# Patient Record
Sex: FEMALE | Race: WHITE
[De-identification: names, ages, dates, MRNs, and addresses within clinical notes are randomized per-mention and may not be internally consistent; named-entity substitution may affect disease eponyms.]

---

## 2017-03-27 ENCOUNTER — HOSPITAL ENCOUNTER (OUTPATIENT)
Dept: HOSPITAL 8 - ED | Age: 62
Setting detail: OBSERVATION
LOS: 1 days | Discharge: PSYCHIATRIC OR SUBSTANCE USE FACILITY | End: 2017-03-28
Attending: INTERNAL MEDICINE | Admitting: INTERNAL MEDICINE
Payer: MEDICARE

## 2017-03-27 VITALS — WEIGHT: 187.39 LBS | BODY MASS INDEX: 31.99 KG/M2 | HEIGHT: 64 IN

## 2017-03-27 DIAGNOSIS — F29: Primary | ICD-10-CM

## 2017-03-27 DIAGNOSIS — R94.6: ICD-10-CM

## 2017-03-27 DIAGNOSIS — F25.0: ICD-10-CM

## 2017-03-27 DIAGNOSIS — B35.6: ICD-10-CM

## 2017-03-27 DIAGNOSIS — E11.65: ICD-10-CM

## 2017-03-27 DIAGNOSIS — F22: ICD-10-CM

## 2017-03-27 DIAGNOSIS — R44.0: ICD-10-CM

## 2017-03-27 LAB
APAP SERPL-MCNC: < 2 MCG/ML (ref 10–30)
AST SERPL-CCNC: 20 U/L (ref 15–37)
BUN SERPL-MCNC: 23 MG/DL (ref 7–18)
DAU SCREEN: (no result)
HGB BLD-MCNC: 14 G/DL (ref 11.7–16.4)
ICTOTEST: NEGATIVE

## 2017-03-27 PROCEDURE — 36415 COLL VENOUS BLD VENIPUNCTURE: CPT

## 2017-03-27 PROCEDURE — 82962 GLUCOSE BLOOD TEST: CPT

## 2017-03-27 PROCEDURE — 99285 EMERGENCY DEPT VISIT HI MDM: CPT

## 2017-03-27 PROCEDURE — 87086 URINE CULTURE/COLONY COUNT: CPT

## 2017-03-27 PROCEDURE — 84481 FREE ASSAY (FT-3): CPT

## 2017-03-27 PROCEDURE — 80329 ANALGESICS NON-OPIOID 1 OR 2: CPT

## 2017-03-27 PROCEDURE — G0480 DRUG TEST DEF 1-7 CLASSES: HCPCS

## 2017-03-27 PROCEDURE — 85025 COMPLETE CBC W/AUTO DIFF WBC: CPT

## 2017-03-27 PROCEDURE — 84443 ASSAY THYROID STIM HORMONE: CPT

## 2017-03-27 PROCEDURE — 81001 URINALYSIS AUTO W/SCOPE: CPT

## 2017-03-27 PROCEDURE — 84439 ASSAY OF FREE THYROXINE: CPT

## 2017-03-27 PROCEDURE — 80053 COMPREHEN METABOLIC PANEL: CPT

## 2017-03-27 PROCEDURE — G0378 HOSPITAL OBSERVATION PER HR: HCPCS

## 2017-03-27 PROCEDURE — 80307 DRUG TEST PRSMV CHEM ANLYZR: CPT

## 2017-03-27 PROCEDURE — 83036 HEMOGLOBIN GLYCOSYLATED A1C: CPT

## 2017-03-27 RX ADMIN — KETOCONAZOLE SCH APPLIC: 20 CREAM TOPICAL at 21:00

## 2017-03-27 RX ADMIN — ENOXAPARIN SODIUM SCH MG: 40 INJECTION SUBCUTANEOUS at 18:00

## 2017-03-27 RX ADMIN — NICOTINE SCH PATCH: 14 PATCH, EXTENDED RELEASE TRANSDERMAL at 20:54

## 2017-03-28 VITALS — SYSTOLIC BLOOD PRESSURE: 109 MMHG | DIASTOLIC BLOOD PRESSURE: 69 MMHG

## 2017-03-28 VITALS — DIASTOLIC BLOOD PRESSURE: 67 MMHG | SYSTOLIC BLOOD PRESSURE: 100 MMHG

## 2017-03-28 RX ADMIN — ENOXAPARIN SODIUM SCH MG: 40 INJECTION SUBCUTANEOUS at 18:00

## 2017-03-28 RX ADMIN — NITROFURANTOIN MACROCRYSTALS SCH MG: 50 CAPSULE ORAL at 10:44

## 2017-03-28 RX ADMIN — NITROFURANTOIN MACROCRYSTALS SCH MG: 50 CAPSULE ORAL at 20:22

## 2017-03-28 RX ADMIN — KETOCONAZOLE SCH APPLIC: 20 CREAM TOPICAL at 19:28

## 2017-03-28 RX ADMIN — NICOTINE SCH PATCH: 14 PATCH, EXTENDED RELEASE TRANSDERMAL at 09:13

## 2017-03-28 RX ADMIN — NICOTINE SCH PATCH: 14 PATCH, EXTENDED RELEASE TRANSDERMAL at 16:41

## 2017-03-28 RX ADMIN — NITROFURANTOIN MACROCRYSTALS SCH MG: 50 CAPSULE ORAL at 15:50

## 2017-03-28 RX ADMIN — KETOCONAZOLE SCH APPLIC: 20 CREAM TOPICAL at 09:00

## 2019-06-21 ENCOUNTER — APPOINTMENT (OUTPATIENT)
Dept: URGENT CARE | Facility: CLINIC | Age: 64
End: 2019-06-21
Payer: MEDICARE

## 2019-12-19 ENCOUNTER — HOSPITAL ENCOUNTER (OUTPATIENT)
Dept: BEHAVIORAL HEALTH | Facility: MEDICAL CENTER | Age: 64
End: 2019-12-19
Attending: FAMILY MEDICINE
Payer: MEDICARE

## 2019-12-19 VITALS
DIASTOLIC BLOOD PRESSURE: 76 MMHG | BODY MASS INDEX: 31.32 KG/M2 | WEIGHT: 206 LBS | OXYGEN SATURATION: 96 % | RESPIRATION RATE: 16 BRPM | HEART RATE: 90 BPM | SYSTOLIC BLOOD PRESSURE: 145 MMHG

## 2019-12-19 DIAGNOSIS — F15.10 METHAMPHETAMINE USE DISORDER, MILD, ABUSE (HCC): ICD-10-CM

## 2019-12-19 DIAGNOSIS — F25.1 SCHIZOAFFECTIVE DISORDER, DEPRESSIVE TYPE (HCC): ICD-10-CM

## 2019-12-19 DIAGNOSIS — R79.9 ABNORMAL FINDING OF BLOOD CHEMISTRY, UNSPECIFIED: ICD-10-CM

## 2019-12-19 DIAGNOSIS — R73.03 PREDIABETES: ICD-10-CM

## 2019-12-19 DIAGNOSIS — F43.10 PTSD (POST-TRAUMATIC STRESS DISORDER): ICD-10-CM

## 2019-12-19 PROCEDURE — 99204 OFFICE O/P NEW MOD 45 MIN: CPT | Performed by: FAMILY MEDICINE

## 2019-12-19 RX ORDER — PAROXETINE HYDROCHLORIDE 20 MG/1
20 TABLET, FILM COATED ORAL DAILY
Qty: 30 TAB | Refills: 1
Start: 2019-12-19 | End: 2020-04-30 | Stop reason: SDUPTHER

## 2019-12-19 RX ORDER — BUSPIRONE HYDROCHLORIDE 10 MG/1
10 TABLET ORAL 2 TIMES DAILY
Qty: 60 TAB | Refills: 2 | Status: SHIPPED | OUTPATIENT
Start: 2019-12-19 | End: 2020-01-16 | Stop reason: SDUPTHER

## 2019-12-19 RX ORDER — ARIPIPRAZOLE 30 MG/1
30 TABLET ORAL DAILY
Qty: 30 TAB | Refills: 1
Start: 2019-12-19 | End: 2021-04-29

## 2019-12-19 ASSESSMENT — ENCOUNTER SYMPTOMS
HEADACHES: 0
CONSTITUTIONAL NEGATIVE: 1
MUSCULOSKELETAL NEGATIVE: 1
PALPITATIONS: 0
DOUBLE VISION: 0
CHILLS: 0
GASTROINTESTINAL NEGATIVE: 1
INSOMNIA: 1
BLURRED VISION: 0
TINGLING: 0
NEUROLOGICAL NEGATIVE: 1
NERVOUS/ANXIOUS: 1
DIZZINESS: 0
HEMOPTYSIS: 0
COUGH: 0
MYALGIAS: 0
EYES NEGATIVE: 1
BRUISES/BLEEDS EASILY: 0
FEVER: 0
RESPIRATORY NEGATIVE: 1
NAUSEA: 0
HEARTBURN: 0
CARDIOVASCULAR NEGATIVE: 1
DEPRESSION: 1

## 2019-12-19 ASSESSMENT — PAIN SCALES - GENERAL: PAINLEVEL: NO PAIN

## 2019-12-19 ASSESSMENT — LIFESTYLE VARIABLES: SUBSTANCE_ABUSE: 1

## 2019-12-19 NOTE — PROGRESS NOTES
Prior to your 16th birthday:  1. Did a parent or other adult in the household often or very often… Swear at you, insult you, put you down, or humiliate you? or Act in a way that made you afraid that you might be physically hurt?  No___If Yes, enter 1 __0  2. Did a parent or other adult in the household often or very often… Push, grab, slap, or throw something at you? or Ever hit you so hard that you had marks or were injured?  No___If Yes, enter 1 __0  3. Did an adult or person at least 5 years older than you ever… Touch or fondle you or have you touch their body in a sexual way? or Attempt or actually have oral, anal, or vaginal intercourse with you  No___If Yes, enter 1 __1  4. Did you often or very often feel that … No one in your family loved you or thought you were important or special? or Your family didn’t look out for each other, feel close to each other, or support each other?  No___If Yes, enter 1 __0  5. Did you often or very often feel that … You didn’t have enough to eat, had to wear dirty clothes, and had no one to protect you? or Your parents were too drunk or high to take care of you or take you to the doctor if you needed it?  No___If Yes, enter 1 __1  6. Were your parents ever  or ?  No___If Yes, enter 1 __1  7. Was your mother or stepmother:  Often or very often pushed, grabbed, slapped, or had something thrown at her? or Sometimes, often, or very often kicked, bitten, hit with a fist, or hit with something hard? or Ever repeatedly hit over at least a few minutes or threatened with a gun or knife?  No___If Yes, enter 1 __1  8. Did you live with anyone who was a problem drinker or alcoholic, or who used street drugs?  No___If Yes, enter 1 __0  9. Was a household member depressed or mentally ill, or did a household member attempt suicide?                        No___If Yes, enter 1 __1  10. Did a household member go to half-way?  No___If Yes, enter 1 __0  Now add up your “Yes”  answers: _ This is your ACE Score 5
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
## 2020-01-16 ENCOUNTER — HOSPITAL ENCOUNTER (OUTPATIENT)
Dept: BEHAVIORAL HEALTH | Facility: MEDICAL CENTER | Age: 65
End: 2020-01-16
Attending: PSYCHIATRY & NEUROLOGY
Payer: MEDICARE

## 2020-01-16 VITALS
RESPIRATION RATE: 16 BRPM | OXYGEN SATURATION: 96 % | DIASTOLIC BLOOD PRESSURE: 79 MMHG | SYSTOLIC BLOOD PRESSURE: 125 MMHG | BODY MASS INDEX: 30.31 KG/M2 | HEART RATE: 81 BPM | WEIGHT: 200 LBS | HEIGHT: 68 IN

## 2020-01-16 DIAGNOSIS — F25.1 SCHIZOAFFECTIVE DISORDER, DEPRESSIVE TYPE (HCC): ICD-10-CM

## 2020-01-16 DIAGNOSIS — E55.9 VITAMIN D DEFICIENCY: ICD-10-CM

## 2020-01-16 DIAGNOSIS — F43.10 PTSD (POST-TRAUMATIC STRESS DISORDER): ICD-10-CM

## 2020-01-16 DIAGNOSIS — F15.10 METHAMPHETAMINE USE DISORDER, MILD, ABUSE (HCC): ICD-10-CM

## 2020-01-16 DIAGNOSIS — R73.03 PREDIABETES: ICD-10-CM

## 2020-01-16 PROCEDURE — 99214 OFFICE O/P EST MOD 30 MIN: CPT | Performed by: FAMILY MEDICINE

## 2020-01-16 RX ORDER — BUSPIRONE HYDROCHLORIDE 10 MG/1
10 TABLET ORAL 2 TIMES DAILY
Qty: 60 TAB | Refills: 2 | Status: SHIPPED
Start: 2020-01-16 | End: 2020-04-30 | Stop reason: SINTOL

## 2020-01-16 ASSESSMENT — ENCOUNTER SYMPTOMS
COUGH: 0
HEADACHES: 0
TINGLING: 0
NEUROLOGICAL NEGATIVE: 1
DIZZINESS: 0
HEMOPTYSIS: 0
MUSCULOSKELETAL NEGATIVE: 1
CARDIOVASCULAR NEGATIVE: 1
FEVER: 0
HEARTBURN: 0
CHILLS: 0
GASTROINTESTINAL NEGATIVE: 1
NERVOUS/ANXIOUS: 1
PALPITATIONS: 0
BRUISES/BLEEDS EASILY: 0
RESPIRATORY NEGATIVE: 1
EYES NEGATIVE: 1
NAUSEA: 0
DOUBLE VISION: 0
CONSTITUTIONAL NEGATIVE: 1
DEPRESSION: 0
BLURRED VISION: 0
MYALGIAS: 0

## 2020-01-16 ASSESSMENT — PAIN SCALES - GENERAL: PAINLEVEL: NO PAIN

## 2020-01-16 ASSESSMENT — LIFESTYLE VARIABLES: SUBSTANCE_ABUSE: 1

## 2020-01-16 NOTE — PROGRESS NOTES
"Subjective:      Angelica Dumont is a 64 y.o. female who presents with No chief complaint on file.            1. Schizoaffective disorder, depressive type (HCC)  Patient noted that they have just been taking buspar qd instead of rx of bid  Will start bid  Mood still a bit down at times  Also reports that she feels that she has a \"split personality\" that she noted for past year. She states that she will not remember times and people tell her she was aggressive and foul-mouthed during this time period  Asked her to keep diary of incidents so that we can discuss them further at our next visit  - busPIRone (BUSPAR) 10 MG Tab tablet; Take 1 Tab by mouth 2 times a day.  Dispense: 60 Tab; Refill: 2    2. Methamphetamine use disorder, mild, abuse (HCC)  Has not changed her use of meth, still 3-4 times per week  Will increase buspar dosage and Today the patient was counseled on avoidance of people, places and things that could be triggers for substance use    - busPIRone (BUSPAR) 10 MG Tab tablet; Take 1 Tab by mouth 2 times a day.  Dispense: 60 Tab; Refill: 2    3. Prediabetes   Labs with elevated a1c of 6  Due to the patient's issues with glucose management, today they were extensively counseled on a low carb diet and exercise and losing weight      4. Vitamin D deficiency  Labs show patient is low on vitamin d, they needs to replace this with 2000 units per day otc      5. PTSD (post-traumatic stress disorder)    - busPIRone (BUSPAR) 10 MG Tab tablet; Take 1 Tab by mouth 2 times a day.  Dispense: 60 Tab; Refill: 2    Past Medical History:  No date: Schizophrenia (HCC)  No date: Substance abuse ([facility])      Comment:  meth use  Past Surgical History:  No date: APPENDECTOMY  No date: OPEN REDUCTION  Social History    Tobacco Use      Smoking status: Current Every Day Smoker        Packs/day: 0.50      Smokeless tobacco: Never Used    Alcohol use: Not Currently    Drug use: Yes      Frequency: 3.0 times per week      " "Types: Methamphetamines, Inhaled    Review of patient's family history indicates:  Problem: Cancer      Relation: Mother          Age of Onset: (Not Specified)      Current Outpatient Medications: •  busPIRone (BUSPAR) 10 MG Tab tablet, Take 1 Tab by mouth 2 times a day., Disp: 60 Tab, Rfl: 2•  aripiprazole (ABILIFY) 30 MG tablet, Take 1 Tab by mouth every day., Disp: 30 Tab, Rfl: 1•  PARoxetine (PAXIL) 20 MG Tab, Take 1 Tab by mouth every day., Disp: 30 Tab, Rfl: 1    Patient was instructed on the use of medications, either prescriptions or OTC and informed on when the appropriate follow up time period should be. In addition, patient was also instructed that should any acute worsening occur that they should notify this clinic asap or call 911.          Review of Systems   Constitutional: Negative.  Negative for chills and fever.   HENT: Negative.  Negative for hearing loss.    Eyes: Negative.  Negative for blurred vision and double vision.   Respiratory: Negative.  Negative for cough and hemoptysis.    Cardiovascular: Negative.  Negative for chest pain and palpitations.   Gastrointestinal: Negative.  Negative for heartburn and nausea.   Genitourinary: Negative.  Negative for dysuria.   Musculoskeletal: Negative.  Negative for myalgias.   Skin: Negative.  Negative for rash.   Neurological: Negative.  Negative for dizziness, tingling and headaches.   Endo/Heme/Allergies: Negative.  Does not bruise/bleed easily.   Psychiatric/Behavioral: Positive for substance abuse. Negative for depression and suicidal ideas. The patient is nervous/anxious.    All other systems reviewed and are negative.         Objective:     /79 (BP Location: Left arm, Patient Position: Sitting, BP Cuff Size: Adult)   Pulse 81   Resp 16   Ht 1.727 m (5' 8\")   Wt 90.7 kg (200 lb)   SpO2 96%   BMI 30.41 kg/m²      Physical Exam  Vitals signs and nursing note reviewed.   Constitutional:       General: She is not in acute distress.     " "Appearance: She is well-developed. She is not diaphoretic.   HENT:      Head: Normocephalic and atraumatic.      Mouth/Throat:      Pharynx: No oropharyngeal exudate.   Eyes:      Pupils: Pupils are equal, round, and reactive to light.   Cardiovascular:      Rate and Rhythm: Normal rate and regular rhythm.      Heart sounds: Normal heart sounds. No murmur. No friction rub. No gallop.    Pulmonary:      Effort: Pulmonary effort is normal. No respiratory distress.      Breath sounds: Normal breath sounds. No wheezing or rales.   Chest:      Chest wall: No tenderness.   Neurological:      Mental Status: She is alert and oriented to person, place, and time.   Psychiatric:         Attention and Perception: Attention normal.         Mood and Affect: Mood normal.         Speech: Speech normal.         Behavior: Behavior normal.         Thought Content: Thought content normal.         Cognition and Memory: Cognition and memory normal.         Judgment: Judgment normal.                 Assessment/Plan:   1. Schizoaffective disorder, depressive type (HCC)  Patient noted that they have just been taking buspar qd instead of rx of bid  Will start bid  Mood still a bit down at times  Also reports that she feels that she has a \"split personality\" that she noted for past year. She states that she will not remember times and people tell her she was aggressive and foul-mouthed during this time period  Asked her to keep diary of incidents so that we can discuss them further at our next visit  - busPIRone (BUSPAR) 10 MG Tab tablet; Take 1 Tab by mouth 2 times a day.  Dispense: 60 Tab; Refill: 2    2. Methamphetamine use disorder, mild, abuse (HCC)  Has not changed her use of meth, still 3-4 times per week  Will increase buspar dosage and Today the patient was counseled on avoidance of people, places and things that could be triggers for substance use    - busPIRone (BUSPAR) 10 MG Tab tablet; Take 1 Tab by mouth 2 times a day.  " Dispense: 60 Tab; Refill: 2    3. Prediabetes   Labs with elevated a1c of 6  Due to the patient's issues with glucose management, today they were extensively counseled on a low carb diet and exercise and losing weight      4. Vitamin D deficiency  Labs show patient is low on vitamin d, they needs to replace this with 2000 units per day otc      5. PTSD (post-traumatic stress disorder)    - busPIRone (BUSPAR) 10 MG Tab tablet; Take 1 Tab by mouth 2 times a day.  Dispense: 60 Tab; Refill: 2

## 2020-02-27 ENCOUNTER — HOSPITAL ENCOUNTER (OUTPATIENT)
Dept: BEHAVIORAL HEALTH | Facility: MEDICAL CENTER | Age: 65
End: 2020-02-27
Attending: PSYCHIATRY & NEUROLOGY
Payer: MEDICARE

## 2020-02-27 VITALS
BODY MASS INDEX: 30.31 KG/M2 | HEIGHT: 68 IN | DIASTOLIC BLOOD PRESSURE: 84 MMHG | WEIGHT: 200 LBS | HEART RATE: 86 BPM | SYSTOLIC BLOOD PRESSURE: 136 MMHG

## 2020-02-27 DIAGNOSIS — F25.1 SCHIZOAFFECTIVE DISORDER, DEPRESSIVE TYPE (HCC): ICD-10-CM

## 2020-02-27 DIAGNOSIS — F43.10 PTSD (POST-TRAUMATIC STRESS DISORDER): ICD-10-CM

## 2020-02-27 DIAGNOSIS — F15.10 METHAMPHETAMINE USE DISORDER, MILD, ABUSE (HCC): ICD-10-CM

## 2020-02-27 PROCEDURE — 99213 OFFICE O/P EST LOW 20 MIN: CPT | Performed by: FAMILY MEDICINE

## 2020-02-27 ASSESSMENT — ENCOUNTER SYMPTOMS
NAUSEA: 0
TINGLING: 0
FEVER: 0
CARDIOVASCULAR NEGATIVE: 1
RESPIRATORY NEGATIVE: 1
PALPITATIONS: 0
NEUROLOGICAL NEGATIVE: 1
COUGH: 0
EYES NEGATIVE: 1
DEPRESSION: 0
HEARTBURN: 0
CHILLS: 0
DIZZINESS: 0
GASTROINTESTINAL NEGATIVE: 1
BRUISES/BLEEDS EASILY: 0
MYALGIAS: 0
HEADACHES: 0
DOUBLE VISION: 0
MUSCULOSKELETAL NEGATIVE: 1
BLURRED VISION: 0
HEMOPTYSIS: 0
CONSTITUTIONAL NEGATIVE: 1

## 2020-02-27 ASSESSMENT — LIFESTYLE VARIABLES: SUBSTANCE_ABUSE: 1

## 2020-02-27 NOTE — PROGRESS NOTES
Subjective:      Angelica Dumont is a 64 y.o. female who presents with No chief complaint on file.            1. Schizoaffective disorder, depressive type (HCC)  Stable with no recent psychotic episodes nor changes in mood  No recent occurrences of 'other personality' that changes location of items in her room    2. Methamphetamine use disorder, mild, abuse (HCC)  Still using 1-2 times per week but less than before  Has insight in that her usage is related to anger issues and certain other residents of her complex who come over and do meth with her  Today the patient was counseled on avoidance of people, places and things that could be triggers for substance use      3. PTSD (post-traumatic stress disorder)  Stable  Continue with meds    Past Medical History:  No date: Schizophrenia (HCC)  No date: Substance abuse (HCC)      Comment:  meth use  Past Surgical History:  No date: APPENDECTOMY  No date: OPEN REDUCTION  Social History    Tobacco Use      Smoking status: Current Every Day Smoker        Packs/day: 0.50      Smokeless tobacco: Never Used    Alcohol use: Not Currently    Drug use: Yes      Frequency: 3.0 times per week      Types: Methamphetamines, Inhaled    Review of patient's family history indicates:  Problem: Cancer      Relation: Mother          Age of Onset: (Not Specified)      Current Outpatient Medications: •  busPIRone (BUSPAR) 10 MG Tab tablet, Take 1 Tab by mouth 2 times a day., Disp: 60 Tab, Rfl: 2•  aripiprazole (ABILIFY) 30 MG tablet, Take 1 Tab by mouth every day., Disp: 30 Tab, Rfl: 1•  PARoxetine (PAXIL) 20 MG Tab, Take 1 Tab by mouth every day., Disp: 30 Tab, Rfl: 1    Patient was instructed on the use of medications, either prescriptions or OTC and informed on when the appropriate follow up time period should be. In addition, patient was also instructed that should any acute worsening occur that they should notify this clinic asap or call 911.          Review of Systems  "  Constitutional: Negative.  Negative for chills and fever.   HENT: Negative.  Negative for hearing loss.    Eyes: Negative.  Negative for blurred vision and double vision.   Respiratory: Negative.  Negative for cough and hemoptysis.    Cardiovascular: Negative.  Negative for chest pain and palpitations.   Gastrointestinal: Negative.  Negative for heartburn and nausea.   Genitourinary: Negative.  Negative for dysuria.   Musculoskeletal: Negative.  Negative for myalgias.   Skin: Negative.  Negative for rash.   Neurological: Negative.  Negative for dizziness, tingling and headaches.   Endo/Heme/Allergies: Negative.  Does not bruise/bleed easily.   Psychiatric/Behavioral: Positive for substance abuse. Negative for depression and suicidal ideas.   All other systems reviewed and are negative.         Objective:     /84 (BP Location: Left arm, Patient Position: Sitting, BP Cuff Size: Adult)   Pulse 86   Ht 1.727 m (5' 8\")   Wt 90.7 kg (200 lb)   BMI 30.41 kg/m²      Physical Exam  Vitals signs and nursing note reviewed.   Constitutional:       General: She is not in acute distress.     Appearance: She is well-developed. She is not diaphoretic.   HENT:      Head: Normocephalic and atraumatic.      Mouth/Throat:      Pharynx: No oropharyngeal exudate.   Eyes:      Pupils: Pupils are equal, round, and reactive to light.   Cardiovascular:      Rate and Rhythm: Normal rate and regular rhythm.      Heart sounds: Normal heart sounds. No murmur. No friction rub. No gallop.    Pulmonary:      Effort: Pulmonary effort is normal. No respiratory distress.      Breath sounds: Normal breath sounds. No wheezing or rales.   Chest:      Chest wall: No tenderness.   Neurological:      Mental Status: She is alert and oriented to person, place, and time.   Psychiatric:         Mood and Affect: Mood normal.         Speech: Speech normal.         Behavior: Behavior normal.         Thought Content: Thought content normal.         " Judgment: Judgment normal.                 Assessment/Plan:   1. Schizoaffective disorder, depressive type (HCC)  Stable with no recent psychotic episodes nor changes in mood  No recent occurrences of 'other personality' that changes location of items in her room    2. Methamphetamine use disorder, mild, abuse (HCC)  Still using 1-2 times per week but less than before  Has insight in that her usage is related to anger issues and certain other residents of her complex who come over and do meth with her  Today the patient was counseled on avoidance of people, places and things that could be triggers for substance use      3. PTSD (post-traumatic stress disorder)  Stable  Continue with meds

## 2020-04-28 ENCOUNTER — HOSPITAL ENCOUNTER (OUTPATIENT)
Dept: BEHAVIORAL HEALTH | Facility: MEDICAL CENTER | Age: 65
End: 2020-04-28
Attending: PSYCHIATRY & NEUROLOGY
Payer: MEDICARE

## 2020-04-28 DIAGNOSIS — F15.20 METHAMPHETAMINE USE DISORDER, MODERATE (HCC): ICD-10-CM

## 2020-04-28 PROCEDURE — 90791 PSYCH DIAGNOSTIC EVALUATION: CPT
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
## 2020-04-28 NOTE — PROGRESS NOTES
I have reviewed the note by Stephanie Singh, CPC-I and agree with the assessment and treatment plan.    1. Admit to Intensive Outpatient Program on 5/4/2020   - Group therapy per schedule,    - Psychoeducational groups per schedule,   - Individual/family counseling sessions with  per treatment plan.  2. Symptoms necessitating Intensive Outpatient Treatment: methamphetamine use  3. Medical screening/Physical exam per primary care provider or referring facility.    Maryanne Flores MD

## 2020-04-30 ENCOUNTER — TELEPHONE (OUTPATIENT)
Dept: BEHAVIORAL HEALTH | Facility: MEDICAL CENTER | Age: 65
End: 2020-04-30

## 2020-04-30 ENCOUNTER — HOSPITAL ENCOUNTER (OUTPATIENT)
Dept: BEHAVIORAL HEALTH | Facility: MEDICAL CENTER | Age: 65
End: 2020-04-30
Attending: FAMILY MEDICINE
Payer: MEDICARE

## 2020-04-30 VITALS
SYSTOLIC BLOOD PRESSURE: 128 MMHG | OXYGEN SATURATION: 92 % | DIASTOLIC BLOOD PRESSURE: 88 MMHG | BODY MASS INDEX: 30.19 KG/M2 | WEIGHT: 199.2 LBS | HEART RATE: 99 BPM | HEIGHT: 68 IN

## 2020-04-30 DIAGNOSIS — F43.10 PTSD (POST-TRAUMATIC STRESS DISORDER): ICD-10-CM

## 2020-04-30 DIAGNOSIS — F25.1 SCHIZOAFFECTIVE DISORDER, DEPRESSIVE TYPE (HCC): ICD-10-CM

## 2020-04-30 DIAGNOSIS — F15.20 METHAMPHETAMINE USE DISORDER, MODERATE (HCC): ICD-10-CM

## 2020-04-30 PROCEDURE — 99214 OFFICE O/P EST MOD 30 MIN: CPT | Performed by: FAMILY MEDICINE

## 2020-04-30 RX ORDER — PAROXETINE 30 MG/1
30 TABLET, FILM COATED ORAL DAILY
Qty: 90 TAB | Refills: 1 | Status: SHIPPED
Start: 2020-04-30 | End: 2021-04-29

## 2020-04-30 ASSESSMENT — ENCOUNTER SYMPTOMS
FEVER: 0
NEUROLOGICAL NEGATIVE: 1
NAUSEA: 0
EYES NEGATIVE: 1
CONSTITUTIONAL NEGATIVE: 1
TINGLING: 0
BRUISES/BLEEDS EASILY: 0
MUSCULOSKELETAL NEGATIVE: 1
GASTROINTESTINAL NEGATIVE: 1
HALLUCINATIONS: 1
HEMOPTYSIS: 0
RESPIRATORY NEGATIVE: 1
CHILLS: 0
PALPITATIONS: 0
HEADACHES: 0
CARDIOVASCULAR NEGATIVE: 1
MYALGIAS: 0
HEARTBURN: 0
DIZZINESS: 0
COUGH: 0
NERVOUS/ANXIOUS: 1
DEPRESSION: 1
DOUBLE VISION: 0
BLURRED VISION: 0

## 2020-04-30 ASSESSMENT — LIFESTYLE VARIABLES: SUBSTANCE_ABUSE: 1

## 2020-04-30 NOTE — PROGRESS NOTES
Subjective:      Angelica Dumont is a 65 y.o. female who presents with Follow-Up (Med management )            1. Methamphetamine use disorder, moderate (HCC)  Patient reports being sober for 4 days now  Her main issues is the other members of her apartment complex many of whom are fellow drug users and will offer drugs to her  Long discussion today about Today the patient was counseled on avoidance of people, places and things that could be triggers for substance use  She has started the IOP program here which may provide some help in distancing herself from bad influences  2. Schizoaffective disorder, depressive type (HCC)  Reports some episodes of anxiety and depression regarding different issues at times  Able to do all adls  Will try increase in her paxil and f/u in 4 weeks for efficacy    - PARoxetine (PAXIL) 30 MG Tab; Take 1 Tab by mouth every day.  Dispense: 90 Tab; Refill: 1    3. PTSD (post-traumatic stress disorder)    - PARoxetine (PAXIL) 30 MG Tab; Take 1 Tab by mouth every day.  Dispense: 90 Tab; Refill: 1    Past Medical History:  No date: Schizophrenia (HCC)  No date: Substance abuse (HCC)      Comment:  meth use  Past Surgical History:  No date: APPENDECTOMY  No date: OPEN REDUCTION  Social History    Tobacco Use      Smoking status: Current Every Day Smoker        Packs/day: 0.50      Smokeless tobacco: Never Used    Alcohol use: Not Currently    Drug use: Yes      Frequency: 3.0 times per week      Types: Methamphetamines, Inhaled    Review of patient's family history indicates:  Problem: Cancer      Relation: Mother          Age of Onset: (Not Specified)  Problem: Alcohol abuse      Relation: Mother          Age of Onset: (Not Specified)  Problem: Alcohol abuse      Relation: Brother          Age of Onset: (Not Specified)  Problem: Drug abuse      Relation: Brother          Age of Onset: (Not Specified)  Problem: Schizophrenia      Relation: Brother          Age of Onset: (Not  "Specified)  Problem: Depression      Relation: Maternal Grandmother          Age of Onset: (Not Specified)  Problem: Alcohol abuse      Relation: Step-Father          Age of Onset: (Not Specified)      Current Outpatient Medications: •  PARoxetine (PAXIL) 30 MG Tab, Take 1 Tab by mouth every day., Disp: 90 Tab, Rfl: 1•  aripiprazole (ABILIFY) 30 MG tablet, Take 1 Tab by mouth every day., Disp: 30 Tab, Rfl: 1    Patient was instructed on the use of medications, either prescriptions or OTC and informed on when the appropriate follow up time period should be. In addition, patient was also instructed that should any acute worsening occur that they should notify this clinic asap or call 911.          Review of Systems   Constitutional: Negative.  Negative for chills and fever.   HENT: Negative.  Negative for hearing loss.    Eyes: Negative.  Negative for blurred vision and double vision.   Respiratory: Negative.  Negative for cough and hemoptysis.    Cardiovascular: Negative.  Negative for chest pain and palpitations.   Gastrointestinal: Negative.  Negative for heartburn and nausea.   Genitourinary: Negative.  Negative for dysuria.   Musculoskeletal: Negative.  Negative for myalgias.   Skin: Negative.  Negative for rash.   Neurological: Negative.  Negative for dizziness, tingling and headaches.   Endo/Heme/Allergies: Negative.  Does not bruise/bleed easily.   Psychiatric/Behavioral: Positive for depression, hallucinations and substance abuse. Negative for suicidal ideas. The patient is nervous/anxious.    All other systems reviewed and are negative.         Objective:     /88 (BP Location: Left arm, Patient Position: Sitting, BP Cuff Size: Large adult)   Pulse 99   Ht 1.715 m (5' 7.5\")   Wt 90.4 kg (199 lb 3.2 oz)   SpO2 92%   BMI 30.74 kg/m²      Physical Exam  Vitals signs and nursing note reviewed.   Constitutional:       General: She is not in acute distress.     Appearance: She is well-developed. She is " not diaphoretic.   HENT:      Head: Normocephalic and atraumatic.      Mouth/Throat:      Pharynx: No oropharyngeal exudate.   Eyes:      Pupils: Pupils are equal, round, and reactive to light.   Cardiovascular:      Rate and Rhythm: Normal rate and regular rhythm.      Heart sounds: Normal heart sounds. No murmur. No friction rub. No gallop.    Pulmonary:      Effort: Pulmonary effort is normal. No respiratory distress.      Breath sounds: Normal breath sounds. No wheezing or rales.   Chest:      Chest wall: No tenderness.   Neurological:      Mental Status: She is alert and oriented to person, place, and time.   Psychiatric:         Attention and Perception: Attention normal.         Mood and Affect: Mood normal.         Speech: Speech normal.         Behavior: Behavior normal.         Thought Content: Thought content normal.         Judgment: Judgment normal.                 Assessment/Plan:     1. Methamphetamine use disorder, moderate (HCC)  Patient reports being sober for 4 days now  Her main issues is the other members of her apartment complex many of whom are fellow drug users and will offer drugs to her  Long discussion today about Today the patient was counseled on avoidance of people, places and things that could be triggers for substance use  She has started the IOP program here which may provide some help in distancing herself from bad influences  2. Schizoaffective disorder, depressive type (HCC)  Reports some episodes of anxiety and depression regarding different issues at times  Able to do all adls  Will try increase in her paxil and f/u in 4 weeks for efficacy    - PARoxetine (PAXIL) 30 MG Tab; Take 1 Tab by mouth every day.  Dispense: 90 Tab; Refill: 1    3. PTSD (post-traumatic stress disorder)    - PARoxetine (PAXIL) 30 MG Tab; Take 1 Tab by mouth every day.  Dispense: 90 Tab; Refill: 1

## 2020-04-30 NOTE — TELEPHONE ENCOUNTER
Called Angelica and left her a message that her insurance is covered and she does not have a co-pay.

## 2020-05-04 ENCOUNTER — HOSPITAL ENCOUNTER (OUTPATIENT)
Dept: BEHAVIORAL HEALTH | Facility: MEDICAL CENTER | Age: 65
End: 2020-05-04
Attending: PSYCHIATRY & NEUROLOGY
Payer: MEDICARE

## 2020-05-04 DIAGNOSIS — F15.20 METHAMPHETAMINE USE DISORDER, MODERATE (HCC): ICD-10-CM

## 2020-05-04 DIAGNOSIS — F15.10 METHAMPHETAMINE USE DISORDER, MILD, ABUSE (HCC): ICD-10-CM

## 2020-05-04 DIAGNOSIS — F25.1 SCHIZOAFFECTIVE DISORDER, DEPRESSIVE TYPE (HCC): ICD-10-CM

## 2020-05-04 DIAGNOSIS — F43.10 POSTTRAUMATIC STRESS DISORDER: ICD-10-CM

## 2020-05-04 PROCEDURE — 90834 PSYTX W PT 45 MINUTES: CPT

## 2020-05-04 PROCEDURE — 90853 GROUP PSYCHOTHERAPY: CPT | Performed by: MARRIAGE & FAMILY THERAPIST

## 2020-05-04 NOTE — BH THERAPY
Group Therapy Checklist  Attendance: Attended  Attendance Duration (min): (60)  Number of Participants: 4  Program/Group: Intensive Outpatient Program  Topics Covered: Addiction behaviors  Participation: Active verbal participation  Affect/Mood Range: Constricted  Affect/Mood Display: Congruent w/content  Cognition: Oriented, Alert  Evidence of Imminent Suicide Risk: No  Evidence of imminent homicide risk: No  Therapeutic Interventions: Psychoeducation re: (Comment), Cognitive clarification  Progress Toward Treatment Goal: Moderate improvement
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
## 2020-05-04 NOTE — BH THERAPY
Group Therapy Checklist  Attendance: Attended  Attendance Duration (min): (90)  Number of Participants: 4  Program/Group: Intensive Outpatient Program  Topics Covered: (Process Group)  Participation: Active verbal participation, Attentive, Guarded/resistant. Angelica talked about being her struggles to better control her behaviors.   Affect/Mood Range: Constricted  Affect/Mood Display: Congruent w/content  Cognition: Alert, Oriented  Evidence of Imminent Suicide Risk: No  Evidence of imminent homicide risk: No  Therapeutic Interventions: Supportive psychotherapy, Emotion clarification, Cognitive clarification  Progress Toward Treatment Goal: Mild improvement

## 2020-05-06 ENCOUNTER — HOSPITAL ENCOUNTER (OUTPATIENT)
Dept: BEHAVIORAL HEALTH | Facility: MEDICAL CENTER | Age: 65
End: 2020-05-06
Attending: PSYCHIATRY & NEUROLOGY
Payer: MEDICARE

## 2020-05-06 DIAGNOSIS — F15.10 METHAMPHETAMINE USE DISORDER, MILD, ABUSE (HCC): ICD-10-CM

## 2020-05-06 PROCEDURE — 90853 GROUP PSYCHOTHERAPY: CPT

## 2020-05-06 NOTE — BH THERAPY
Group Therapy Checklist  Attendance: Attended  Attendance Duration (min): (60)  Number of Participants: 4  Program/Group: Intensive Outpatient Program  Topics Covered: 12 Step orientation  Participation: Active verbal participation, Attentive  Affect/Mood Range: Normal range, Flexible  Affect/Mood Display: Congruent w/content  Cognition: Alert, Oriented  Evidence of Imminent Suicide Risk: No  Evidence of imminent homicide risk: No  Therapeutic Interventions: Cognitive clarification, Relapse prevention  Progress Toward Treatment Goal: Mild improvement

## 2020-05-06 NOTE — BH THERAPY
Group Therapy Checklist  Attendance: Attended  Attendance Duration (min): (90)  Number of Participants: 4  Program/Group: Intensive Outpatient Program  Topics Covered: (Group Therapy)  Participation: Active verbal participation. Pt shared that she is here to quit using meth and heal the relationships with her daughters. She also shared her Dx.   Affect/Mood Range: Constricted  Affect/Mood Display: Congruent w/content  Cognition: Oriented, Alert  Evidence of Imminent Suicide Risk: No  Evidence of imminent homicide risk: No  Progress Toward Treatment Goal: Moderate improvement

## 2020-05-08 ENCOUNTER — HOSPITAL ENCOUNTER (OUTPATIENT)
Dept: BEHAVIORAL HEALTH | Facility: MEDICAL CENTER | Age: 65
End: 2020-05-08
Attending: PSYCHIATRY & NEUROLOGY
Payer: MEDICARE

## 2020-05-08 DIAGNOSIS — F15.10 METHAMPHETAMINE USE DISORDER, MILD, ABUSE (HCC): ICD-10-CM

## 2020-05-08 PROCEDURE — 90853 GROUP PSYCHOTHERAPY: CPT

## 2020-05-08 NOTE — BH THERAPY
Group Therapy Checklist  Attendance: Attended  Attendance Duration (min): (60)  Number of Participants: 4  Program/Group: Intensive Outpatient Program  Topics Covered: Codependency  Participation: Active verbal participation, Attentive, Open to feedback  Affect/Mood Range: Normal range, Flexible  Affect/Mood Display: Congruent w/content  Cognition: Alert, Oriented  Evidence of Imminent Suicide Risk: No  Evidence of imminent homicide risk: No  Therapeutic Interventions: Cognitive clarification, Emotion clarification  Progress Toward Treatment Goal: Moderate improvement

## 2020-05-08 NOTE — BH THERAPY
Group Therapy Checklist  Attendance: Attended  Attendance Duration (min): (90)  Number of Participants: 4  Program/Group: Intensive Outpatient Program  Topics Covered: (Group Therapy)  Participation: Active verbal participation.  Pt stated her awareness of needing to change her behavior of yelling and displays of anger with her daughters which pushes them away.   Affect/Mood Range: Constricted  Affect/Mood Display: Congruent w/content  Cognition: Oriented, Alert  Evidence of Imminent Suicide Risk: No  Evidence of imminent homicide risk: No  Therapeutic Interventions: Emotion clarification, Cognitive clarification  Progress Toward Treatment Goal: Moderate improvement

## 2020-05-11 ENCOUNTER — HOSPITAL ENCOUNTER (OUTPATIENT)
Dept: BEHAVIORAL HEALTH | Facility: MEDICAL CENTER | Age: 65
End: 2020-05-11
Attending: PSYCHIATRY & NEUROLOGY
Payer: MEDICARE

## 2020-05-11 ENCOUNTER — TELEPHONE (OUTPATIENT)
Dept: BEHAVIORAL HEALTH | Facility: MEDICAL CENTER | Age: 65
End: 2020-05-11

## 2020-05-11 DIAGNOSIS — F15.10 METHAMPHETAMINE USE DISORDER, MILD, ABUSE (HCC): ICD-10-CM

## 2020-05-11 DIAGNOSIS — F25.1 SCHIZOAFFECTIVE DISORDER, DEPRESSIVE TYPE (HCC): ICD-10-CM

## 2020-05-11 PROCEDURE — 90853 GROUP PSYCHOTHERAPY: CPT | Performed by: MARRIAGE & FAMILY THERAPIST

## 2020-05-11 PROCEDURE — 90834 PSYTX W PT 45 MINUTES: CPT

## 2020-05-11 NOTE — BH THERAPY
Group Therapy Checklist  Attendance: Attended  Attendance Duration (min): (90)  Number of Participants: 4  Program/Group: Intensive Outpatient Program  Topics Covered: (Process Group)  Participation: Active verbal participation, Attentive. Angelica shared today that she does not have access to sad, scare, or happy. Mostly she expresses all of her emotions with anger.   Affect/Mood Range: Normal range  Affect/Mood Display: Congruent w/content  Cognition: Alert, Oriented  Evidence of Imminent Suicide Risk: No  Evidence of imminent homicide risk: No  Therapeutic Interventions: Supportive psychotherapy, Therapeutic metaphor, Emotion clarification  Progress Toward Treatment Goal: Mild improvement

## 2020-05-11 NOTE — BH THERAPY
Group Therapy Checklist  Attendance: Attended  Attendance Duration (min): (60)  Number of Participants: 4  Program/Group: Intensive Outpatient Program  Topics Covered: Steps 1/2/3  Participation: Active verbal participation  Affect/Mood Range: Constricted, Flexible  Affect/Mood Display: Congruent w/content  Cognition: Oriented, Alert  Evidence of Imminent Suicide Risk: No  Evidence of imminent homicide risk: No  Therapeutic Interventions: Psychoeducation re: (Comment), Cognitive clarification  Progress Toward Treatment Goal: Moderate improvement

## 2020-05-11 NOTE — TELEPHONE ENCOUNTER
[facility]    Treatment Team Staffing    Patient Name: Angelica Dumont Program: IOP Date: 5/11/2020     Attendees: IESHA Grimm, [facility]; Fay Dias RN, Stephanie Singh, CPC-I, and Maryanne Flores MD     Patient's Progress toward Goals Listed on the Treatment Plan: abstinent, working on the first three steps of 12 step modality.     1. Client's Participation When in Attendance Was: Active in a Positive Way    2. Counselor's Evaluation of Client's Progress: Positive Movement    3. Patient is attending group and individual sessions and is progressing well toward the treatment goals: yes      YES NO   A. Relapse During Program []  [x]    B. Requires physician review []  [x]    C. Referral to program inappropriate []  [x]    D. Non compliance with Treatment Plan []  [x]    E. Early treatment termination (lack of attendance) []  [x]     []  []      Comments: still not speaking with daughter and grandchildren, taking it in stride.     Treatment Plan Review: - Next appointment scheduled:  5/13/2020  - Patient is in agreement with the above plan:  YES

## 2020-05-11 NOTE — CARE PLAN
" [facility]  Therapy Progress Note    Patient Name: Angelica Dumont  Patient MRN: [id number]  Today's Date: 5/11/2020     Type of session:Individual psychotherapy  Length of session: 45 minutes  Persons in attendance:Patient    Subjective/New Info: individual session. Angelica processed her step one. She is reluctant to admit that she is powerless over anything \"if you say so\" or her unmanageability.  She reports not being able to stop one substance without picking up another, going back and forth between meth, marijuana and sex. She is reluctant to admit the financial burden \"I can afford it\" or the emotional cost \"I've just ignored it\", but today she feels that she just never looked at addiction in this way before. She states that after today's forum on the first three steps, she is recognizing the above. She also notices that she feels better clean and without behavior that compromises her integrity. She feels that she learned how to manage her mental health with discipline: sleep diet and medication with visits to her psychiatrist and is looking to adopt the same for her addictions.  She feels she has great saul and we introduced step two to explore that relationship; to be discussed at next weeks session, 5/18.    Objective/Observations:   Participation: Active verbal participation, Attentive, Guarded, and Defensive   Grooming: Casual and Neat   Cognition: Alert and Fully Oriented   Eye contact: Limited   Mood: Anxious   Affect: Flexible, Full range, and Congruent with content   Thought process: Logical and Goal-directed   Speech: Rate within normal limits and Volume within normal limits   Other:     Diagnoses: meth use, schizoaffective disorder    Current risk:   SUICIDE: Low   Homicide: Low   Self-harm: Low   Relapse: Moderate   Other:    Safety Plan reviewed? Not Indicated   If evidence of imminent risk is present, intervention/plan:     Therapeutic Intervention(s): Behavior:  Behavior " "analysis, Clarify:  Clarify values, Review treatment plan, Stressors assessed, and Supportive psychotherapy    Treatment Goal(s)/Objective(s) addressed: abstinence, step work and spirituality, and learn to regulate impulsive behaviors.      Progress toward Treatment Goals: Mild improvement    Plan:  - Continue Intensive Outpatient Program  - \"Homework\" recommendation: step two to be discussed and processed 5/18 1:1 session  - Next appointment scheduled:  5/13/2020  - Patient is in agreement with the above plan:  YES    Fay Dias R.N.  5/11/2020                                   "

## 2020-05-13 ENCOUNTER — HOSPITAL ENCOUNTER (OUTPATIENT)
Dept: BEHAVIORAL HEALTH | Facility: MEDICAL CENTER | Age: 65
End: 2020-05-13
Attending: PSYCHIATRY & NEUROLOGY
Payer: MEDICARE

## 2020-05-13 DIAGNOSIS — F15.10 METHAMPHETAMINE USE DISORDER, MILD, ABUSE (HCC): ICD-10-CM

## 2020-05-13 PROCEDURE — 90853 GROUP PSYCHOTHERAPY: CPT

## 2020-05-13 NOTE — BH THERAPY
Group Therapy Checklist  Attendance: Attended  Attendance Duration (min): (60)  Number of Participants: 5  Program/Group: Intensive Outpatient Program  Topics Covered: Anger  Participation: Active verbal participation, Attentive  Affect/Mood Range: Normal range, Flexible  Affect/Mood Display: Congruent w/content  Cognition: Alert, Oriented  Evidence of Imminent Suicide Risk: No  Evidence of imminent homicide risk: No  Therapeutic Interventions: Emotion clarification, Cognitive clarification  Progress Toward Treatment Goal: Moderate improvement

## 2020-05-13 NOTE — BH THERAPY
Group Therapy Checklist  Attendance: Attended  Attendance Duration (min): (90)  Number of Participants: 5  Program/Group: Intensive Outpatient Program  Topics Covered: (Group Therapy)  Participation: Active verbal participation.  Pt expressed sadness about not being able to see her dtrs and grandchildren very often. She has about 2 weeks clean and sober.  Affect/Mood Range: Constricted  Affect/Mood Display: Congruent w/content  Cognition: Oriented, Alert  Evidence of Imminent Suicide Risk: No  Evidence of imminent homicide risk: No  Therapeutic Interventions: Emotion clarification, Cognitive clarification  Progress Toward Treatment Goal: Moderate improvement

## 2020-05-15 ENCOUNTER — HOSPITAL ENCOUNTER (OUTPATIENT)
Dept: BEHAVIORAL HEALTH | Facility: MEDICAL CENTER | Age: 65
End: 2020-05-15
Attending: PSYCHIATRY & NEUROLOGY
Payer: MEDICARE

## 2020-05-15 DIAGNOSIS — F15.10 METHAMPHETAMINE USE DISORDER, MILD, ABUSE (HCC): ICD-10-CM

## 2020-05-15 PROCEDURE — 90853 GROUP PSYCHOTHERAPY: CPT

## 2020-05-15 NOTE — BH THERAPY
Group Therapy Checklist  Attendance: Attended  Attendance Duration (min): (60)  Number of Participants: 5  Program/Group: Intensive Outpatient Program  Topics Covered: Med aspects [place]  Participation: Attentive  Affect/Mood Range: Normal range, Flexible  Affect/Mood Display: Congruent w/content  Cognition: Alert, Oriented  Evidence of Imminent Suicide Risk: No  Evidence of imminent homicide risk: No  Therapeutic Interventions: Cognitive clarification, Relapse prevention  Progress Toward Treatment Goal: Moderate improvement

## 2020-05-15 NOTE — BH THERAPY
Group Therapy Checklist  Attendance: Attended  Attendance Duration (min): (90)  Number of Participants: 5  Program/Group: Intensive Outpatient Program  Topics Covered: (Group Therapy)  Participation: Active verbal participation. Pt stated that she has 15 days sober and felt proud about that. She is working on changing the people in her life so she is around sober positive people.  She processed the emotion of 'hurt' in her realtionships with her family.  Affect/Mood Range: Flexible  Affect/Mood Display: Congruent w/content, Sad  Cognition: Oriented, Alert  Evidence of Imminent Suicide Risk: No  Evidence of imminent homicide risk: No  Therapeutic Interventions: Emotion clarification, Cognitive clarification  Progress Toward Treatment Goal: Moderate improvement

## 2020-05-18 ENCOUNTER — HOSPITAL ENCOUNTER (OUTPATIENT)
Dept: BEHAVIORAL HEALTH | Facility: MEDICAL CENTER | Age: 65
End: 2020-05-18
Attending: PSYCHIATRY & NEUROLOGY
Payer: MEDICARE

## 2020-05-18 DIAGNOSIS — F25.1 SCHIZOAFFECTIVE DISORDER, DEPRESSIVE TYPE (HCC): ICD-10-CM

## 2020-05-18 DIAGNOSIS — F15.10 METHAMPHETAMINE USE DISORDER, MILD, ABUSE (HCC): ICD-10-CM

## 2020-05-18 PROCEDURE — 90834 PSYTX W PT 45 MINUTES: CPT

## 2020-05-18 PROCEDURE — 90853 GROUP PSYCHOTHERAPY: CPT | Performed by: MARRIAGE & FAMILY THERAPIST

## 2020-05-18 NOTE — ADDENDUM NOTE
Encounter addended by: Fay Dias R.N. on: 5/18/2020 3:28 PM   Actions taken: Order Reconciliation Section accessed, Medication List reviewed

## 2020-05-18 NOTE — BH THERAPY
Group Therapy Checklist  Attendance: Attended  Attendance Duration (min): (60)  Number of Participants: 5  Program/Group: Intensive Outpatient Program  Topics Covered: Relapse Prevention  Participation: Active verbal participation  Affect/Mood Range: Flexible  Affect/Mood Display: Congruent w/content, Sad  Cognition: Oriented, Alert  Evidence of Imminent Suicide Risk: No  Evidence of imminent homicide risk: No  Therapeutic Interventions: Psychoeducation re: (Comment), Cognitive clarification  Progress Toward Treatment Goal: Moderate improvement

## 2020-05-18 NOTE — CARE PLAN
[facility]  Therapy Progress Note    Patient Name: Angelica Dumont  Patient MRN: [id number]  Today's Date: 5/18/2020     Type of session:Individual psychotherapy  Length of session: 45 minutes  Persons in attendance:Patient    Subjective/New Info: individual session. Angelica processed her second step. She has a higher power and she feels that God is always with her taking care of her. She reports continued abstinence from all drugs of abuse and from alcohol. She states its the first time and she is concerned that she will relapse because she has a history of relapse. She carries a picture of her grandson, Garcia to remind her of better times, bigger smiles and a clean life. Processed her daily schedule, she stays with her routine in the morning, walking for health; she feels she is making healthier food choices with more fruits and vegetables. Discussed her relationship with her brother who is sober; she reaches out to him for support. Angelica states she and her daughter are speaking again and it has her hopeful for a reunion with grandchildren down the road. Assigned step three for next session.     Objective/Observations:   Participation: Active verbal participation and Attentive   Grooming: Casual and Neat   Cognition: Alert and Fully Oriented   Eye contact: Limited   Mood: Anxious   Affect: Constricted and Congruent with content   Thought process: Logical and Goal-directed   Speech: Rate within normal limits and Volume within normal limits   Other:     Diagnoses: No diagnosis found.     Current risk:   SUICIDE: Low   Homicide: Low   Self-harm: Low   Relapse: Moderate   Other:    Safety Plan reviewed? Not Indicated   If evidence of imminent risk is present, intervention/plan:     Therapeutic Intervention(s): Clarify:  Clarify thoughts, Distress tolerance skills, Parenting/familial roles addressed, Self-care skills, and Supportive psychotherapy    Treatment Goal(s)/Objective(s) addressed: 12 step  "recovery and abstinence, family roles     Progress toward Treatment Goals: Moderate improvement and Mild improvement    Plan:  - Continue Intensive Outpatient Program  - \"Homework\" recommendation: step three to be reviewed 5/22 at next session (5/25 is holiday)  - Next appointment scheduled:  5/20/2020  - Patient is in agreement with the above plan:  YES    Fay Dias R.N.  5/18/2020                                   "

## 2020-05-18 NOTE — BH THERAPY
Group Therapy Checklist  Attendance: Attended  Attendance Duration (min): (90)  Number of Participants: 5  Program/Group: Intensive Outpatient Program  Topics Covered: (Process Group)  Participation: Active verbal participation, Attentive. Patient actively participated in process group.  Addressed active unresolved emotional issues. Taught some emotional skills and techniques to assist with the emotional skill building that relates to their presenting issues and active treatment plan.  Affect/Mood Range: Normal range  Affect/Mood Display: Congruent w/content  Cognition: Oriented, Alert  Evidence of Imminent Suicide Risk: No  Evidence of imminent homicide risk: No  Therapeutic Interventions: Emotion clarification, Cognitive clarification, Supportive psychotherapy  Progress Toward Treatment Goal: Mild improvement

## 2020-05-19 ENCOUNTER — TELEPHONE (OUTPATIENT)
Dept: BEHAVIORAL HEALTH | Facility: MEDICAL CENTER | Age: 65
End: 2020-05-19

## 2020-05-19 NOTE — TELEPHONE ENCOUNTER
[facility]    Treatment Team Staffing    Patient Name: Angelica Dumont Program: IOP Date: 5/19/2020     Attendees: IESHA Grimm, [facility]; Fay Dias RN, and Maryanne Flores MD     Patient's Progress toward Goals Listed on the Treatment Plan: on task    1. Client's Participation When in Attendance Was: Active in a Positive Way    2. Counselor's Evaluation of Client's Progress: Positive Movement    3. Patient is attending group and individual sessions and is progressing well toward the treatment goals: yes      YES NO   A. Relapse During Program []  [x]    B. Requires physician review []  [x]    C. Referral to program inappropriate []  [x]    D. Non compliance with Treatment Plan []  [x]    E. Early treatment termination (lack of attendance) []  [x]     []  []      Comments: remains abstinent from all drugs of abuse    Treatment Plan Review: - Continue Intensive Outpatient Program  - Next appointment scheduled:  5/20/2020  - Patient is in agreement with the above plan:  YES

## 2020-05-20 ENCOUNTER — HOSPITAL ENCOUNTER (OUTPATIENT)
Dept: BEHAVIORAL HEALTH | Facility: MEDICAL CENTER | Age: 65
End: 2020-05-20
Attending: PSYCHIATRY & NEUROLOGY
Payer: MEDICARE

## 2020-05-20 DIAGNOSIS — F15.10 METHAMPHETAMINE USE DISORDER, MILD, ABUSE (HCC): ICD-10-CM

## 2020-05-20 PROCEDURE — 90853 GROUP PSYCHOTHERAPY: CPT

## 2020-05-20 NOTE — BH THERAPY
Group Therapy Checklist  Attendance: Attended  Attendance Duration (min): (60)  Number of Participants: 5  Program/Group: Intensive Outpatient Program  Topics Covered: (Family Trauma)  Participation: Active verbal participation, Attentive  Affect/Mood Range: Normal range, Flexible  Affect/Mood Display: Congruent w/content  Cognition: Alert, Oriented  Evidence of Imminent Suicide Risk: No  Evidence of imminent homicide risk: No  Therapeutic Interventions: Cognitive clarification, Values clarification  Progress Toward Treatment Goal: Moderate improvement

## 2020-05-20 NOTE — BH THERAPY
Group Therapy Checklist  Attendance: Attended  Attendance Duration (min): (90)  Number of Participants: 5  Program/Group: Intensive Outpatient Program  Topics Covered: (Group Therapy)  Participation: Active verbal participation.  Pt shared about setting boundaries with people in her commitment to stay sober and that her relationship with her daughters is improving as she is talking to them,again.   Affect/Mood Range: Flexible  Affect/Mood Display: Congruent w/content, Sad  Cognition: Oriented, Alert  Evidence of Imminent Suicide Risk: No  Evidence of imminent homicide risk: No  Therapeutic Interventions: Emotion clarification, Cognitive clarification  Progress Toward Treatment Goal: Moderate improvement

## 2020-05-21 ENCOUNTER — HOSPITAL ENCOUNTER (OUTPATIENT)
Dept: BEHAVIORAL HEALTH | Facility: MEDICAL CENTER | Age: 65
End: 2020-05-21
Attending: FAMILY MEDICINE
Payer: MEDICARE

## 2020-05-21 VITALS
WEIGHT: 206 LBS | OXYGEN SATURATION: 92 % | SYSTOLIC BLOOD PRESSURE: 136 MMHG | HEART RATE: 85 BPM | HEIGHT: 68 IN | DIASTOLIC BLOOD PRESSURE: 73 MMHG | BODY MASS INDEX: 31.22 KG/M2

## 2020-05-21 DIAGNOSIS — F25.1 SCHIZOAFFECTIVE DISORDER, DEPRESSIVE TYPE (HCC): ICD-10-CM

## 2020-05-21 DIAGNOSIS — F15.10 METHAMPHETAMINE USE DISORDER, MILD, ABUSE (HCC): ICD-10-CM

## 2020-05-21 DIAGNOSIS — R73.03 PREDIABETES: ICD-10-CM

## 2020-05-21 PROCEDURE — 99214 OFFICE O/P EST MOD 30 MIN: CPT | Performed by: FAMILY MEDICINE

## 2020-05-21 ASSESSMENT — ENCOUNTER SYMPTOMS
EYES NEGATIVE: 1
NAUSEA: 0
HEARTBURN: 0
NEUROLOGICAL NEGATIVE: 1
DEPRESSION: 0
GASTROINTESTINAL NEGATIVE: 1
CONSTITUTIONAL NEGATIVE: 1
PALPITATIONS: 0
TINGLING: 0
BLURRED VISION: 0
CARDIOVASCULAR NEGATIVE: 1
FEVER: 0
DIZZINESS: 0
MUSCULOSKELETAL NEGATIVE: 1
DOUBLE VISION: 0
RESPIRATORY NEGATIVE: 1
COUGH: 0
MYALGIAS: 0
CHILLS: 0
HEMOPTYSIS: 0
HEADACHES: 0
BRUISES/BLEEDS EASILY: 0

## 2020-05-21 ASSESSMENT — LIFESTYLE VARIABLES: SUBSTANCE_ABUSE: 1

## 2020-05-21 NOTE — PROGRESS NOTES
Subjective:      Angelica Dumont is a 65 y.o. female who presents with Follow-Up (Med Management )            1. Methamphetamine use disorder, mild, abuse (HCC)  Doing very well with no use of meth since she was last here 4 weeks ago  Trying to avoid others in her complex who are users  Trying to eat better and exercise  Relationship with her daughters is better with no use of meth  .peolp      2. Schizoaffective disorder, depressive type (HCC)  Stable on current meds    3.   prediabetes - Due to the patient's issues with glucose management, today they were extensively counseled on a low carb diet and exercise and losing weight      Past Medical History:  No date: Schizophrenia (HCC)  No date: Substance abuse (HCC)      Comment:  meth use  Past Surgical History:  No date: APPENDECTOMY  No date: OPEN REDUCTION  Social History    Tobacco Use      Smoking status: Current Every Day Smoker        Packs/day: 0.50      Smokeless tobacco: Never Used    Alcohol use: Not Currently    Drug use: Yes      Frequency: 3.0 times per week      Types: Methamphetamines, Inhaled    Review of patient's family history indicates:  Problem: Cancer      Relation: Mother          Age of Onset: (Not Specified)  Problem: Alcohol abuse      Relation: Mother          Age of Onset: (Not Specified)  Problem: Alcohol abuse      Relation: Brother          Age of Onset: (Not Specified)  Problem: Drug abuse      Relation: Brother          Age of Onset: (Not Specified)  Problem: Schizophrenia      Relation: Brother          Age of Onset: (Not Specified)  Problem: Depression      Relation: Maternal Grandmother          Age of Onset: (Not Specified)  Problem: Alcohol abuse      Relation: Step-Father          Age of Onset: (Not Specified)      Current Outpatient Medications: •  PARoxetine (PAXIL) 30 MG Tab, Take 1 Tab by mouth every day., Disp: 90 Tab, Rfl: 1•  aripiprazole (ABILIFY) 30 MG tablet, Take 1 Tab by mouth every day., Disp: 30 Tab, Rfl:  "1    Patient was instructed on the use of medications, either prescriptions or OTC and informed on when the appropriate follow up time period should be. In addition, patient was also instructed that should any acute worsening occur that they should notify this clinic asap or call 911.          Review of Systems   Constitutional: Negative.  Negative for chills and fever.   HENT: Negative.  Negative for hearing loss.    Eyes: Negative.  Negative for blurred vision and double vision.   Respiratory: Negative.  Negative for cough and hemoptysis.    Cardiovascular: Negative.  Negative for chest pain and palpitations.   Gastrointestinal: Negative.  Negative for heartburn and nausea.   Genitourinary: Negative.  Negative for dysuria.   Musculoskeletal: Negative.  Negative for myalgias.   Skin: Negative.  Negative for rash.   Neurological: Negative.  Negative for dizziness, tingling and headaches.   Endo/Heme/Allergies: Negative.  Does not bruise/bleed easily.   Psychiatric/Behavioral: Positive for substance abuse. Negative for depression and suicidal ideas.   All other systems reviewed and are negative.         Objective:     /73 (BP Location: Left arm, Patient Position: Sitting, BP Cuff Size: Large adult)   Pulse 85   Ht 1.727 m (5' 8\")   Wt 93.4 kg (206 lb)   SpO2 92%   BMI 31.32 kg/m²      Physical Exam  Vitals signs and nursing note reviewed.   Constitutional:       General: She is not in acute distress.     Appearance: She is well-developed. She is not diaphoretic.   HENT:      Head: Normocephalic and atraumatic.      Mouth/Throat:      Pharynx: No oropharyngeal exudate.   Eyes:      Pupils: Pupils are equal, round, and reactive to light.   Cardiovascular:      Rate and Rhythm: Normal rate and regular rhythm.      Heart sounds: Normal heart sounds. No murmur. No friction rub. No gallop.    Pulmonary:      Effort: Pulmonary effort is normal. No respiratory distress.      Breath sounds: Normal breath sounds. No " wheezing or rales.   Chest:      Chest wall: No tenderness.   Neurological:      Mental Status: She is alert and oriented to person, place, and time.   Psychiatric:         Behavior: Behavior normal.         Thought Content: Thought content normal.         Judgment: Judgment normal.                 Assessment/Plan:     1. Methamphetamine use disorder, mild, abuse (HCC)  Doing very well with no use of meth since she was last here 4 weeks ago  Trying to avoid others in her complex who are users  Trying to eat better and exercise  Relationship with her daughters is better with no use of meth  .peolp      2. Schizoaffective disorder, depressive type (HCC)  Stable on current meds    3.   prediabetes - Due to the patient's issues with glucose management, today they were extensively counseled on a low carb diet and exercise and losing weight

## 2020-05-21 NOTE — ADDENDUM NOTE
Encounter addended by: Maurice De La Vega M.D. on: 5/21/2020 4:05 PM   Actions taken: Charge Capture section accepted

## 2020-05-22 ENCOUNTER — HOSPITAL ENCOUNTER (OUTPATIENT)
Dept: BEHAVIORAL HEALTH | Facility: MEDICAL CENTER | Age: 65
End: 2020-05-22
Attending: PSYCHIATRY & NEUROLOGY
Payer: MEDICARE

## 2020-05-22 DIAGNOSIS — F15.10 METHAMPHETAMINE USE DISORDER, MILD, ABUSE (HCC): ICD-10-CM

## 2020-05-22 DIAGNOSIS — F25.1 SCHIZOAFFECTIVE DISORDER, DEPRESSIVE TYPE (HCC): ICD-10-CM

## 2020-05-22 PROCEDURE — 90853 GROUP PSYCHOTHERAPY: CPT

## 2020-05-22 PROCEDURE — 90832 PSYTX W PT 30 MINUTES: CPT

## 2020-05-22 NOTE — CARE PLAN
" [facility]  Therapy Progress Note    Patient Name: Angelica Dumont  Patient MRN: [id number]  Today's Date: 5/22/2020     Type of session:Individual psychotherapy  Length of session: 30 minutes  Persons in attendance:Patient    Subjective/New Info: individual session. Angelica has not completed her third step assignment. Discussed her overall mental health today. She feels mood stability, less impulsivity and calmer. She dyed her hair red with help of a girlfriend and feels brighter. Angelica has weekend plans to finish an art project and states she will bring it to the group when it is finished. She feels she is challenged by group but enjoys the meetings.     Objective/Observations:   Participation: Active verbal participation, Attentive, Engaged, and Open to feedback   Grooming: Casual and Neat   Cognition: Alert and Fully Oriented   Eye contact: Limited   Mood: Anxious   Affect: Flexible, Full range, and Congruent with content   Thought process: Logical and Goal-directed   Speech: Rate within normal limits and Volume within normal limits   Other:     Diagnoses: meth use, in remission and schizoaffective disorder    Current risk:   SUICIDE: Low   Homicide: Low   Self-harm: Low   Relapse: Low   Other:    Safety Plan reviewed? Not Indicated   If evidence of imminent risk is present, intervention/plan:     Therapeutic Intervention(s): Behavior:  Behavior analysis, Distress tolerance skills, Review treatment plan, Self-care skills, and Supportive psychotherapy    Treatment Goal(s)/Objective(s) addressed: mood stability with medication management; learn to challenge and change distortions in thinking; and work an identified program of recovery and relapse planning     Progress toward Treatment Goals: Mild improvement    Plan:  - Continue Intensive Outpatient Program  - \"Homework\" recommendation: step three and relapse prevention plan to be discussed at final 1:1 June 1st  - Next appointment scheduled:  " 5/27/2020  - Patient is in agreement with the above plan:  YES    Fay Dias R.N.  5/22/2020

## 2020-05-22 NOTE — BH THERAPY
Group Therapy Checklist  Attendance: Attended  Attendance Duration (min): (60)  Number of Participants: 5  Program/Group: Intensive Outpatient Program  Topics Covered: (Family Roles)  Participation: Active verbal participation, Attentive  Affect/Mood Range: Normal range, Flexible  Affect/Mood Display: Congruent w/content  Cognition: Alert, Oriented  Evidence of Imminent Suicide Risk: No  Evidence of imminent homicide risk: No  Therapeutic Interventions: Cognitive clarification, Emotion clarification  Progress Toward Treatment Goal: Mild improvement

## 2020-05-27 ENCOUNTER — HOSPITAL ENCOUNTER (OUTPATIENT)
Dept: BEHAVIORAL HEALTH | Facility: MEDICAL CENTER | Age: 65
End: 2020-05-27
Attending: PSYCHIATRY & NEUROLOGY
Payer: MEDICARE

## 2020-05-27 DIAGNOSIS — F15.10 METHAMPHETAMINE USE DISORDER, MILD, ABUSE (HCC): ICD-10-CM

## 2020-05-27 PROCEDURE — 90853 GROUP PSYCHOTHERAPY: CPT

## 2020-05-27 NOTE — BH THERAPY
Group Therapy Checklist  Attendance: Attended  Attendance Duration (min): (90)  Number of Participants: 6  Program/Group: Intensive Outpatient Program  Topics Covered: (Group Therapy)  Participation: Active verbal participation. Pt shared her artwork she is donating to [facility] of a flower with emotions on its petals. She processed her delight of spending 3 hours with her daughter and her two grand children.   Affect/Mood Range: Flexible  Affect/Mood Display: Congruent w/content  Cognition: Oriented, Alert  Evidence of Imminent Suicide Risk: No  Evidence of imminent homicide risk: No  Therapeutic Interventions: Emotion clarification, Cognitive clarification  Progress Toward Treatment Goal: Moderate improvement

## 2020-05-27 NOTE — BH THERAPY
Group Therapy Checklist  Attendance: Attended  Attendance Duration (min): (60)  Number of Participants: 6  Program/Group: Intensive Outpatient Program  Topics Covered: (Attitudes and Beliefs)  Participation: Active verbal participation, Attentive  Affect/Mood Range: Normal range, pt wearing a mask   Affect/Mood Display: Congruent w/content  Cognition: Oriented, Alert  Evidence of Imminent Suicide Risk: No  Evidence of imminent homicide risk: No  Therapeutic Interventions: Psychoeducation re: Attitudes and Beliefs  Progress Toward Treatment Goal: Mild improvement

## 2020-05-29 ENCOUNTER — HOSPITAL ENCOUNTER (OUTPATIENT)
Dept: BEHAVIORAL HEALTH | Facility: MEDICAL CENTER | Age: 65
End: 2020-05-29
Attending: PSYCHIATRY & NEUROLOGY
Payer: MEDICARE

## 2020-05-29 DIAGNOSIS — F15.10 METHAMPHETAMINE USE DISORDER, MILD, ABUSE (HCC): ICD-10-CM

## 2020-05-29 PROCEDURE — 90853 GROUP PSYCHOTHERAPY: CPT

## 2020-05-29 NOTE — BH THERAPY
Group Therapy Checklist  Attendance: Attended  Attendance Duration (min): (60)  Number of Participants: 6  Program/Group: Intensive Outpatient Program  Topics Covered: ACT conept intro  Participation: Active verbal participation, Attentive  Affect/Mood Range: Normal range, Flexible  Affect/Mood Display: Congruent w/content  Cognition: Alert, Oriented  Evidence of Imminent Suicide Risk: No  Evidence of imminent homicide risk: No  Therapeutic Interventions: Emotion clarification, Cognitive clarification, Values clarification, Mindfulness exercise  Progress Toward Treatment Goal: Moderate improvement

## 2020-05-29 NOTE — BH THERAPY
Group Therapy Checklist  Attendance: Attended  Attendance Duration (min): (90)  Number of Participants: 6  Program/Group: Intensive Outpatient Program  Topics Covered: (Group Therapy)  Participation: Active verbal participation. Pt processed feeling more confident and proud this week as she completed another art piece and donated it to [employer] She was proud to have 30 days clean and sober coming up on Monday.    Affect/Mood Range: Flexible  Affect/Mood Display: Congruent w/content  Cognition: Oriented, Alert  Evidence of Imminent Suicide Risk: No  Evidence of imminent homicide risk: No  Therapeutic Interventions: Emotion clarification, Cognitive clarification  Progress Toward Treatment Goal: Moderate improvement

## 2020-06-01 ENCOUNTER — HOSPITAL ENCOUNTER (OUTPATIENT)
Dept: BEHAVIORAL HEALTH | Facility: MEDICAL CENTER | Age: 65
End: 2020-06-01
Attending: PSYCHIATRY & NEUROLOGY
Payer: MEDICARE

## 2020-06-01 DIAGNOSIS — F15.20 AMPHETAMINE DEPENDENCE (HCC): ICD-10-CM

## 2020-06-01 DIAGNOSIS — F15.10 METHAMPHETAMINE USE DISORDER, MILD, ABUSE (HCC): ICD-10-CM

## 2020-06-01 PROCEDURE — 90853 GROUP PSYCHOTHERAPY: CPT | Performed by: MARRIAGE & FAMILY THERAPIST

## 2020-06-01 PROCEDURE — 90834 PSYTX W PT 45 MINUTES: CPT

## 2020-06-01 NOTE — BH THERAPY
Group Therapy Checklist  Attendance: Attended  Attendance Duration (min): (60)  Number of Participants: 5  Program/Group: Intensive Outpatient Program  Topics Covered: Assertiveness  Participation: Active verbal participation  Affect/Mood Range: Flexible  Affect/Mood Display: Congruent w/content, Labile  Evidence of Imminent Suicide Risk: No  Evidence of imminent homicide risk: No  Therapeutic Interventions: Psychoeducation re: (Comment), Cognitive clarification  Progress Toward Treatment Goal: Moderate improvement

## 2020-06-01 NOTE — CARE PLAN
[facility]  Therapy Progress Note    Patient Name: Angelica Dumont  Patient MRN: [id number]  Today's Date: 6/1/2020     Type of session:Individual psychotherapy  Length of session: 45 minutes  Persons in attendance:Patient    Subjective/New Info: individual session. Angelica processed her relapse prevention plan. She is scared of relapse as she approaches thirty days as she has had a past pattern of relapse at 30days in the past. Named strategies to counteract: celebrating with art supplies, avoiding neighbors and staying closer to daughters. She gets paid tomorrow and we discussed not having extra cash around. She will follow up with Dr De La Vega and has [facility] appt on 6/30 with Dr Pabon for psych; she scheduled OP therapy and she will return to 12 step when they open up. She feels more engaged in life with improved diet and exercise. She may babysit grandchildren on Thursday ~ her biggest recovery goal met!    Objective/Observations:   Participation: Active verbal participation, Attentive, Engaged, and Open to feedback   Grooming: Casual and Neat   Cognition: Alert and Fully Oriented   Eye contact: Good   Mood: Anxious   Affect: Flexible, Full range, Congruent with content, and Anxious   Thought process: Logical and Goal-directed   Speech: Rate within normal limits and Volume within normal limits   Other:     Diagnoses: meth use disorder    Current risk:   SUICIDE: Low   Homicide: Low   Self-harm: Low   Relapse: Low   Other:    Safety Plan reviewed? Not Indicated   If evidence of imminent risk is present, intervention/plan:     Therapeutic Intervention(s): Behavior:  Behavioral plan developed/modified, Develop/modify treatment plan, Parenting/familial roles addressed, Self-care skills, Stressors assessed, and Supportive psychotherapy    Treatment Goal(s)/Objective(s) addressed: relapse prevention plan and discharge goal planning     Progress toward Treatment Goals: Moderate improvement    Plan:  - Next  appointment scheduled:  6/3/2020  - Transition toward termination  - Patient is in agreement with the above plan:  YES    Fay Dias R.N.  6/1/2020

## 2020-06-03 ENCOUNTER — HOSPITAL ENCOUNTER (OUTPATIENT)
Dept: BEHAVIORAL HEALTH | Facility: MEDICAL CENTER | Age: 65
End: 2020-06-03
Attending: PSYCHIATRY & NEUROLOGY
Payer: MEDICARE

## 2020-06-03 DIAGNOSIS — F15.10 METHAMPHETAMINE USE DISORDER, MILD, ABUSE (HCC): ICD-10-CM

## 2020-06-03 PROCEDURE — 90853 GROUP PSYCHOTHERAPY: CPT

## 2020-06-03 NOTE — BH THERAPY
Group Therapy Checklist  Attendance: Attended(60)  Attendance Duration (min): (60)  Number of Participants: 7  Program/Group: Intensive Outpatient Program  Topics Covered: Chalk talk  Participation: Active verbal participation, Attentive  Affect/Mood Range: Normal range, Flexible  Affect/Mood Display: Congruent w/content  Cognition: Alert, Oriented  Evidence of Imminent Suicide Risk: No  Evidence of imminent homicide risk: No  Therapeutic Interventions: Cognitive clarification, Relapse prevention  Progress Toward Treatment Goal: Moderate improvement

## 2020-06-03 NOTE — BH THERAPY
Group Therapy Checklist  Attendance: Attended  Attendance Duration (min): (90)  Number of Participants: 7  Program/Group: Intensive Outpatient Program  Topics Covered: (Group Therapy)  Participation: Active verbal participation. Pt processed her anxiety and fear of keeping up her sobriety and not failing. She has 31 days clean and sober and is feeling much better physically and mentally.   Affect/Mood Range: Flexible  Affect/Mood Display: Congruent w/content  Cognition: Oriented, Alert  Evidence of Imminent Suicide Risk: No  Evidence of imminent homicide risk: No  Therapeutic Interventions: Emotion clarification, Cognitive clarification  Progress Toward Treatment Goal: Moderate improvement

## 2020-06-05 ENCOUNTER — TELEPHONE (OUTPATIENT)
Dept: BEHAVIORAL HEALTH | Facility: MEDICAL CENTER | Age: 65
End: 2020-06-05

## 2020-06-05 ENCOUNTER — HOSPITAL ENCOUNTER (OUTPATIENT)
Dept: BEHAVIORAL HEALTH | Facility: MEDICAL CENTER | Age: 65
End: 2020-06-05
Attending: PSYCHIATRY & NEUROLOGY
Payer: MEDICARE

## 2020-06-05 DIAGNOSIS — F15.10 METHAMPHETAMINE USE DISORDER, MILD, ABUSE (HCC): ICD-10-CM

## 2020-06-05 PROCEDURE — 90853 GROUP PSYCHOTHERAPY: CPT

## 2020-06-05 NOTE — TELEPHONE ENCOUNTER
[facility]  TRANSFER/DISCHARGE SUMMARY FORM    HHPI / SCP: no Other Ins.: medicare     Patient Name: Angelica Dumont  Admission Date: 2020 initial, IOP 2020  Level of Care Attended:  Intens.OP : 1955  Transfer/Discharge Date: MRN: [id number]  2020       SIGNIFICANT FINDINGS/CLINICAL IMPRESSION:   DSM Codes: IOP    ICD10 Codes: F15.10 F25.1    Additional problems identified via assessment: family relationships    Treatment Components in Which Patient Participated (check all that apply):  Education group(s), 1:1 teaching/therapy, Medication Management, Group Therapy and 12-Step Group(s)    Summary of Course of Treatment: Active participation all education forums, process groups and individual counseling sessions.     Condition at Time of Transfer/Discharge: Met treatment goals.    [x] Medications Reviewed with Copy to Patient    Referred to: Refer to [facility]: Outpatient Medication Management     Patient is in agreement with discharge plan: yes    Fay Dias R.N.

## 2020-06-05 NOTE — BH THERAPY
Group Therapy Checklist  Attendance: Attended  Attendance Duration (min): (90)  Number of Participants: 4  Program/Group: Intensive Outpatient Program  Topics Covered: (Group Therapy)  Participation: Active verbal participation. Pt processed how she gained much awareness and insight about how good she is feeling since she has 33 days clean and sober that she didn't know how good it would feel mentally ane emotionally as the guilt and shame starts to clear as well as her head and body.   Affect/Mood Range: Flexible  Affect/Mood Display: Congruent w/content  Cognition: Oriented, Alert  Evidence of Imminent Suicide Risk: No  Evidence of imminent homicide risk: No  Therapeutic Interventions: Emotion clarification, Cognitive clarification  Progress Toward Treatment Goal: Moderate improvement

## 2020-06-05 NOTE — BH THERAPY
Group Therapy Checklist  Attendance: Attended  Attendance Duration (min): (60)  Number of Participants: 4  Program/Group: Intensive Outpatient Program  Topics Covered: Belief Systems  Participation: Active verbal participation, Attentive  Affect/Mood Range: Normal range, Flexible  Affect/Mood Display: Congruent w/content  Cognition: Alert, Oriented  Evidence of Imminent Suicide Risk: No  Evidence of imminent homicide risk: No  Therapeutic Interventions: Cognitive clarification, Behavioral activation  Progress Toward Treatment Goal: Moderate improvement

## 2020-06-18 ENCOUNTER — DOCUMENTATION (OUTPATIENT)
Dept: BEHAVIORAL HEALTH | Facility: CLINIC | Age: 65
End: 2020-06-18

## 2020-06-25 ENCOUNTER — HOSPITAL ENCOUNTER (OUTPATIENT)
Dept: BEHAVIORAL HEALTH | Facility: MEDICAL CENTER | Age: 65
End: 2020-06-25
Attending: FAMILY MEDICINE
Payer: MEDICARE

## 2020-06-25 VITALS
HEIGHT: 67 IN | SYSTOLIC BLOOD PRESSURE: 122 MMHG | HEART RATE: 88 BPM | BODY MASS INDEX: 32.26 KG/M2 | OXYGEN SATURATION: 94 % | DIASTOLIC BLOOD PRESSURE: 71 MMHG | RESPIRATION RATE: 14 BRPM

## 2020-06-25 DIAGNOSIS — Z12.11 COLON CANCER SCREENING: ICD-10-CM

## 2020-06-25 DIAGNOSIS — F25.1 SCHIZOAFFECTIVE DISORDER, DEPRESSIVE TYPE (HCC): ICD-10-CM

## 2020-06-25 DIAGNOSIS — F15.10 METHAMPHETAMINE USE DISORDER, MILD, ABUSE (HCC): ICD-10-CM

## 2020-06-25 DIAGNOSIS — Z12.31 ENCOUNTER FOR SCREENING MAMMOGRAM FOR BREAST CANCER: ICD-10-CM

## 2020-06-25 PROCEDURE — 99214 OFFICE O/P EST MOD 30 MIN: CPT | Performed by: FAMILY MEDICINE

## 2020-06-25 ASSESSMENT — ENCOUNTER SYMPTOMS
MUSCULOSKELETAL NEGATIVE: 1
FEVER: 0
RESPIRATORY NEGATIVE: 1
MYALGIAS: 0
DIZZINESS: 0
HEARTBURN: 0
DEPRESSION: 0
DOUBLE VISION: 0
GASTROINTESTINAL NEGATIVE: 1
PALPITATIONS: 0
NEUROLOGICAL NEGATIVE: 1
PSYCHIATRIC NEGATIVE: 1
BLURRED VISION: 0
CARDIOVASCULAR NEGATIVE: 1
NAUSEA: 0
BRUISES/BLEEDS EASILY: 0
EYES NEGATIVE: 1
TINGLING: 0
HEADACHES: 0
CHILLS: 0
COUGH: 0
CONSTITUTIONAL NEGATIVE: 1
HEMOPTYSIS: 0

## 2020-06-25 NOTE — PROGRESS NOTES
Subjective:      Angelica Dumont is a 65 y.o. female who presents with Follow-Up (med management)            1. Methamphetamine use disorder, mild, abuse (HCC)  No use since last visit, doing well  Today the patient was counseled on avoidance of people, places and things that could be triggers for substance use      2. Schizoaffective disorder, depressive type (HCC)  Stable on current meds  The patient's current medical issue is well controlled on the current therapy with no new symptoms or worsening      3. Encounter for screening mammogram for breast cancer    - MA-SCREENING MAMMO BILAT W/CAD; Future    4. Colon cancer screening    - Occult Blood Feces Immunoassay (FIT); Future    Past Medical History:  No date: Schizophrenia (HCC)  No date: Substance abuse (HCC)      Comment:  meth use  Past Surgical History:  No date: APPENDECTOMY  No date: OPEN REDUCTION  Social History    Tobacco Use      Smoking status: Current Every Day Smoker        Packs/day: 0.50      Smokeless tobacco: Never Used    Alcohol use: Not Currently    Drug use: Yes      Frequency: 3.0 times per week      Types: Methamphetamines, Inhaled    Review of patient's family history indicates:  Problem: Cancer      Relation: Mother          Age of Onset: (Not Specified)  Problem: Alcohol abuse      Relation: Mother          Age of Onset: (Not Specified)  Problem: Alcohol abuse      Relation: Brother          Age of Onset: (Not Specified)  Problem: Drug abuse      Relation: Brother          Age of Onset: (Not Specified)  Problem: Schizophrenia      Relation: Brother          Age of Onset: (Not Specified)  Problem: Depression      Relation: Maternal Grandmother          Age of Onset: (Not Specified)  Problem: Alcohol abuse      Relation: Step-Father          Age of Onset: (Not Specified)      Current Outpatient Medications: •  PARoxetine (PAXIL) 30 MG Tab, Take 1 Tab by mouth every day., Disp: 90 Tab, Rfl: 1•  aripiprazole (ABILIFY) 30 MG tablet,  "Take 1 Tab by mouth every day., Disp: 30 Tab, Rfl: 1    Patient was instructed on the use of medications, either prescriptions or OTC and informed on when the appropriate follow up time period should be. In addition, patient was also instructed that should any acute worsening occur that they should notify this clinic asap or call 911.          Review of Systems   Constitutional: Negative.  Negative for chills and fever.   HENT: Negative.  Negative for hearing loss.    Eyes: Negative.  Negative for blurred vision and double vision.   Respiratory: Negative.  Negative for cough and hemoptysis.    Cardiovascular: Negative.  Negative for chest pain and palpitations.   Gastrointestinal: Negative.  Negative for heartburn and nausea.   Genitourinary: Negative.  Negative for dysuria.   Musculoskeletal: Negative.  Negative for myalgias.   Skin: Negative.  Negative for rash.   Neurological: Negative.  Negative for dizziness, tingling and headaches.   Endo/Heme/Allergies: Negative.  Does not bruise/bleed easily.   Psychiatric/Behavioral: Negative.  Negative for depression and suicidal ideas.   All other systems reviewed and are negative.         Objective:     /71 (BP Location: Left arm, Patient Position: Sitting)   Pulse 88   Resp 14   Ht 1.702 m (5' 7\")   SpO2 94%   BMI 32.26 kg/m²      Physical Exam  Vitals signs and nursing note reviewed.   Constitutional:       General: She is not in acute distress.     Appearance: She is well-developed. She is not diaphoretic.   HENT:      Head: Normocephalic and atraumatic.      Right Ear: External ear normal.      Left Ear: External ear normal.      Nose: Nose normal.      Mouth/Throat:      Pharynx: No oropharyngeal exudate.   Eyes:      General: No scleral icterus.        Right eye: No discharge.         Left eye: No discharge.      Pupils: Pupils are equal, round, and reactive to light.   Neck:      Musculoskeletal: Normal range of motion and neck supple.      Thyroid: No " thyromegaly.      Vascular: No JVD.      Trachea: No tracheal deviation.   Cardiovascular:      Rate and Rhythm: Normal rate and regular rhythm.      Heart sounds: Normal heart sounds. No murmur. No friction rub. No gallop.    Pulmonary:      Effort: Pulmonary effort is normal. No respiratory distress.      Breath sounds: Normal breath sounds. No stridor. No wheezing or rales.   Chest:      Chest wall: No tenderness.   Abdominal:      General: There is no distension.      Palpations: Abdomen is soft.      Tenderness: There is no abdominal tenderness.      Hernia: A hernia is present.      Comments: Right lower quadrant incisional hernia from appy, discussed and no treatment wanted at this time   Lymphadenopathy:      Cervical: No cervical adenopathy.   Neurological:      Mental Status: She is alert and oriented to person, place, and time.      Cranial Nerves: No cranial nerve deficit.   Psychiatric:         Behavior: Behavior normal.         Thought Content: Thought content normal.         Judgment: Judgment normal.                 Assessment/Plan:   1. Methamphetamine use disorder, mild, abuse (HCC)  No use since last visit, doing well  Today the patient was counseled on avoidance of people, places and things that could be triggers for substance use      2. Schizoaffective disorder, depressive type (HCC)  Stable on current meds  The patient's current medical issue is well controlled on the current therapy with no new symptoms or worsening      3. Encounter for screening mammogram for breast cancer    - MA-SCREENING MAMMO BILAT W/CAD; Future    4. Colon cancer screening    - Occult Blood Feces Immunoassay (FIT); Future

## 2020-10-15 ENCOUNTER — HOSPITAL ENCOUNTER (OUTPATIENT)
Dept: BEHAVIORAL HEALTH | Facility: MEDICAL CENTER | Age: 65
End: 2020-10-15
Attending: FAMILY MEDICINE
Payer: MEDICARE

## 2020-10-15 VITALS
RESPIRATION RATE: 18 BRPM | HEIGHT: 67 IN | DIASTOLIC BLOOD PRESSURE: 74 MMHG | WEIGHT: 206 LBS | SYSTOLIC BLOOD PRESSURE: 114 MMHG | HEART RATE: 75 BPM | BODY MASS INDEX: 32.33 KG/M2

## 2020-10-15 DIAGNOSIS — F25.1 SCHIZOAFFECTIVE DISORDER, DEPRESSIVE TYPE (HCC): ICD-10-CM

## 2020-10-15 DIAGNOSIS — F15.10 METHAMPHETAMINE USE DISORDER, MILD, ABUSE (HCC): ICD-10-CM

## 2020-10-15 DIAGNOSIS — M79.672 PAIN IN BOTH FEET: ICD-10-CM

## 2020-10-15 DIAGNOSIS — M79.671 PAIN IN BOTH FEET: ICD-10-CM

## 2020-10-15 PROCEDURE — 99214 OFFICE O/P EST MOD 30 MIN: CPT | Performed by: FAMILY MEDICINE

## 2020-10-15 ASSESSMENT — ENCOUNTER SYMPTOMS
CARDIOVASCULAR NEGATIVE: 1
TINGLING: 0
FEVER: 0
HEADACHES: 0
NEUROLOGICAL NEGATIVE: 1
NERVOUS/ANXIOUS: 1
RESPIRATORY NEGATIVE: 1
EYES NEGATIVE: 1
COUGH: 0
PALPITATIONS: 0
DOUBLE VISION: 0
DEPRESSION: 0
CONSTITUTIONAL NEGATIVE: 1
MYALGIAS: 0
HEARTBURN: 0
BRUISES/BLEEDS EASILY: 0
GASTROINTESTINAL NEGATIVE: 1
DIZZINESS: 0
CHILLS: 0
NAUSEA: 0
HEMOPTYSIS: 0
BLURRED VISION: 0

## 2020-10-15 ASSESSMENT — LIFESTYLE VARIABLES: SUBSTANCE_ABUSE: 1

## 2020-10-15 NOTE — PROGRESS NOTES
Subjective:      Angelica Dumont is a 65 y.o. female who presents with No chief complaint on file.            1. Schizoaffective disorder, depressive type (HCC)  The patient's current medical issue is well controlled on the current therapy with no new symptoms or worsening      2. Methamphetamine use disorder, mild, abuse (HCC)  Did have a relapse last week when she was feeling down about herself and past actions  Today the patient was counseled on avoidance of people, places and things that could be triggers for substance use      3. Pain in both feet  Pain in the 2nd and 3rd toes of each foot, numbness worse with use  - REFERRAL TO PODIATRY    Past Medical History:  No date: Schizophrenia (HCC)  No date: Substance abuse (HCC)      Comment:  meth use  Past Surgical History:  No date: APPENDECTOMY  No date: OPEN REDUCTION  Social History    Tobacco Use      Smoking status: Current Every Day Smoker        Packs/day: 0.50      Smokeless tobacco: Never Used    Alcohol use: Not Currently    Drug use: Yes      Frequency: 3.0 times per week      Types: Methamphetamines, Inhaled    Review of patient's family history indicates:  Problem: Cancer      Relation: Mother          Age of Onset: (Not Specified)  Problem: Alcohol abuse      Relation: Mother          Age of Onset: (Not Specified)  Problem: Alcohol abuse      Relation: Brother          Age of Onset: (Not Specified)  Problem: Drug abuse      Relation: Brother          Age of Onset: (Not Specified)  Problem: Schizophrenia      Relation: Brother          Age of Onset: (Not Specified)  Problem: Depression      Relation: Maternal Grandmother          Age of Onset: (Not Specified)  Problem: Alcohol abuse      Relation: Step-Father          Age of Onset: (Not Specified)      Current Outpatient Medications: •  PARoxetine (PAXIL) 30 MG Tab, Take 1 Tab by mouth every day., Disp: 90 Tab, Rfl: 1•  aripiprazole (ABILIFY) 30 MG tablet, Take 1 Tab by mouth every day., Disp:  "30 Tab, Rfl: 1    Patient was instructed on the use of medications, either prescriptions or OTC and informed on when the appropriate follow up time period should be. In addition, patient was also instructed that should any acute worsening occur that they should notify this clinic asap or call 911.          Review of Systems   Constitutional: Negative.  Negative for chills and fever.   HENT: Negative.  Negative for hearing loss.    Eyes: Negative.  Negative for blurred vision and double vision.   Respiratory: Negative.  Negative for cough and hemoptysis.    Cardiovascular: Negative.  Negative for chest pain and palpitations.   Gastrointestinal: Negative.  Negative for heartburn and nausea.   Genitourinary: Negative.  Negative for dysuria.   Musculoskeletal: Positive for joint pain. Negative for myalgias.   Skin: Negative.  Negative for rash.   Neurological: Negative.  Negative for dizziness, tingling and headaches.   Endo/Heme/Allergies: Negative.  Does not bruise/bleed easily.   Psychiatric/Behavioral: Positive for substance abuse. Negative for depression and suicidal ideas. The patient is nervous/anxious.    All other systems reviewed and are negative.         Objective:     /74   Pulse 75   Resp 18   Ht 1.702 m (5' 7\")   Wt 93.4 kg (206 lb)   BMI 32.26 kg/m²      Physical Exam  Vitals signs and nursing note reviewed.   Constitutional:       General: She is not in acute distress.     Appearance: She is well-developed. She is not diaphoretic.   HENT:      Head: Normocephalic and atraumatic.      Mouth/Throat:      Pharynx: No oropharyngeal exudate.   Eyes:      Pupils: Pupils are equal, round, and reactive to light.   Cardiovascular:      Rate and Rhythm: Normal rate and regular rhythm.      Heart sounds: Normal heart sounds. No murmur. No friction rub. No gallop.    Pulmonary:      Effort: Pulmonary effort is normal. No respiratory distress.      Breath sounds: Normal breath sounds. No wheezing or rales. "   Chest:      Chest wall: No tenderness.   Musculoskeletal:        Feet:    Neurological:      Mental Status: She is alert and oriented to person, place, and time.   Psychiatric:         Mood and Affect: Mood is anxious.         Behavior: Behavior normal.         Thought Content: Thought content normal.         Judgment: Judgment normal.                 Assessment/Plan:     1. Schizoaffective disorder, depressive type (HCC)  The patient's current medical issue is well controlled on the current therapy with no new symptoms or worsening      2. Methamphetamine use disorder, mild, abuse (HCC)  Did have a relapse last week when she was feeling down about herself and past actions  Today the patient was counseled on avoidance of people, places and things that could be triggers for substance use      3. Pain in both feet  Pain in the 2nd and 3rd toes of each foot, numbness worse with use  - REFERRAL TO PODIATRY

## 2020-10-27 ENCOUNTER — OFFICE VISIT (OUTPATIENT)
Dept: BEHAVIORAL HEALTH | Facility: CLINIC | Age: 65
End: 2020-10-27
Payer: MEDICARE

## 2020-10-27 DIAGNOSIS — F43.10 POSTTRAUMATIC STRESS DISORDER: ICD-10-CM

## 2020-10-27 DIAGNOSIS — F15.10 METHAMPHETAMINE ABUSE (HCC): ICD-10-CM

## 2020-10-27 DIAGNOSIS — F25.1 SCHIZOAFFECTIVE DISORDER, DEPRESSIVE TYPE (HCC): ICD-10-CM

## 2020-10-27 PROCEDURE — 90791 PSYCH DIAGNOSTIC EVALUATION: CPT | Performed by: PSYCHOLOGIST
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
## 2020-12-10 ENCOUNTER — TELEMEDICINE (OUTPATIENT)
Dept: BEHAVIORAL HEALTH | Facility: CLINIC | Age: 65
End: 2020-12-10
Payer: MEDICARE

## 2020-12-10 DIAGNOSIS — F43.10 POSTTRAUMATIC STRESS DISORDER: ICD-10-CM

## 2020-12-10 DIAGNOSIS — F15.21 METHAMPHETAMINE USE DISORDER, SEVERE, IN EARLY REMISSION (HCC): ICD-10-CM

## 2020-12-10 DIAGNOSIS — F25.1 SCHIZOAFFECTIVE DISORDER, DEPRESSIVE TYPE (HCC): ICD-10-CM

## 2020-12-10 PROCEDURE — 90834 PSYTX W PT 45 MINUTES: CPT | Mod: 95,CR | Performed by: PSYCHOLOGIST

## 2020-12-10 NOTE — BH THERAPY
[facility]  Therapy Progress Note    Patient Name: Angelica Dumont  Patient MRN: [id number]  Today's Date: 12/10/2020     Type of session:Individual psychotherapy  Length of session: 45 minutes  Persons in attendance:Patient    Subjective/New Info: This evaluation was conducted via Zoom using secure and encrypted videoconferencing technology. The patient was in a private location in the [facility].    The patient's identity was confirmed and verbal consent was obtained for this virtual visit.    In questioning whether patient is engaged in step meetings patient says that she finds any meetings discouraging.  Says she does not like hearing the stories that she hears.  Patient says that she plans to move next year and is trying to sign up with housing.  Patient says she still remains clean for 9 months and says that she feels more flexible in her thinking now.  Says that she is more capable of dealing with life and excepting the reality of life.  Discussed some of the difficulties patient has been communicating with her daughter without a dramatic blowup and talked about new skills and approaches to communicate more effectively.  Discussed the need for patient to put herself in a kind of timeout when she starts feeling upset or emotional during talking with her daughter.  Discussed the need to create a more space and allowing patients to change behavior to guide daughter into trusting her again.  Patient said her mood is up and down patient says she has a lot of friends who are also in recovery.  Patient says that during the pandemic she has been engaged in her painting and art.  Patient says that she has had manic episodes in the past.    Objective/Observations:   Participation: Active verbal participation, Attentive and Engaged   Grooming: Casual   Cognition: Alert and Fully Oriented   Eye contact: Limited   Mood: Depressed and Anxious   Affect: Anxious   Thought process: Logical and  Goal-directed   Speech: Rate within normal limits   Other:     Diagnoses:   1. Schizoaffective disorder, depressive type (HCC)    2. PTSD (post-traumatic stress disorder)    3. Methamphetamine use disorder, severe, in early remission (HCC)         Current risk:   SUICIDE: Low   Homicide: Not applicable   Self-harm: Not applicable   Relapse: Low   Other:    Safety Plan reviewed? Not Indicated   If evidence of imminent risk is present, intervention/plan:     Therapeutic Intervention(s): Clarify:  Clarify feelings, Clarify thoughts and Clarify values, Cognitive modification, Establish rapport, Goal-setting, Self-care skills, Stressors assessed and Supportive psychotherapy    Treatment Goal(s)/Objective(s) addressed: Tx Goals:  - Utilize learned skills to manage mood and emotional suffering more effectively  - Utilize learned assertiveness skills to set boundaries & get needs met  - Learn to successfully challenge & change distortions in thinking  - Learn to be more emotionally flexible/resilient in times of stress/challenges  - Improve communication & social/ interpersonal skills  - Work an identified program of recovery & relapse prevention  - Successfully learn to eliminate or reduce intensity of dysregulated affect  - Learn to ameliorate effects of anxiety on life and functioning  - Improve quality of communication/interaction in parent/child relationship  - Increase behaviors of self-compassion and self-care  - The patient was educated to call 911, call the suicide hotline, or go to local ER if having thoughts of suicide or homicide; verbalized understanding.       Progress toward Treatment Goals: Mild improvement    Plan:  - Continue Individual therapy and Medication management    Leanna Regalado, Ph.D.  This dictation has been created using voice recognition software and/or scribes. The accuracy of the dictation is limited by the abilities of the software and the expertise of the scribes. I expect there may be  some errors of grammar and possibly content. I made every attempt to manually correct the errors within my dictation. However, errors related to voice recognition software and/or scribes may still exist and should be interpreted within the appropriate context.

## 2020-12-16 ENCOUNTER — TELEPHONE (OUTPATIENT)
Dept: BEHAVIORAL HEALTH | Facility: MEDICAL CENTER | Age: 65
End: 2020-12-16

## 2020-12-16 NOTE — TELEPHONE ENCOUNTER
Patient cancelled her appointment. States you wanted to see her after podiatry and colonoscopy. I informed her this was a medication management follow up and she states that her psychiatrist has prescribed her the same medications as you have prescribed her. She would like to know if you need a to continue to see her? Please advise. Thank you.

## 2020-12-17 ENCOUNTER — APPOINTMENT (OUTPATIENT)
Dept: BEHAVIORAL HEALTH | Facility: MEDICAL CENTER | Age: 65
End: 2020-12-17
Attending: PSYCHIATRY & NEUROLOGY
Payer: MEDICARE

## 2020-12-24 ENCOUNTER — APPOINTMENT (OUTPATIENT)
Dept: BEHAVIORAL HEALTH | Facility: CLINIC | Age: 65
End: 2020-12-24
Payer: MEDICARE

## 2021-01-20 ENCOUNTER — TELEMEDICINE (OUTPATIENT)
Dept: BEHAVIORAL HEALTH | Facility: CLINIC | Age: 66
End: 2021-01-20
Payer: MEDICARE

## 2021-01-20 DIAGNOSIS — F43.10 POSTTRAUMATIC STRESS DISORDER: ICD-10-CM

## 2021-01-20 DIAGNOSIS — F15.21 METHAMPHETAMINE USE DISORDER, SEVERE, IN EARLY REMISSION (HCC): ICD-10-CM

## 2021-01-20 DIAGNOSIS — F25.1 SCHIZOAFFECTIVE DISORDER, DEPRESSIVE TYPE (HCC): ICD-10-CM

## 2021-01-20 PROCEDURE — 90832 PSYTX W PT 30 MINUTES: CPT | Mod: 95,CR | Performed by: PSYCHOLOGIST

## 2021-01-21 NOTE — BH THERAPY
[facility]  Therapy Progress Note    Patient Name: Angelica Dumont  Patient MRN: [id number]  Today's Date: 1/20/21     Type of session:Individual psychotherapy  Length of session: 30 minutes  Persons in attendance:Patient    Subjective/New Info: As a means of avoiding the spread of COVID-19, this visit is being conducted by telephone. This telephone call was initiated by the provider, consented and requested by the patient.  Start time:11:15am  Stop time:11:45am  Patient was very frustrated about not being able to make the technology work for the video session.  Decided to do a check-in by phone.  Patient reports that she has good days and bad days but lately she has been coping best by immersing herself in her art and says that making art for other people makes her feel better.  Patient reports that she remains clean and sober and is still trying to find housing away from the motel where she lives.  Patient seems to be a little hypomanic as she has been rearranging her little room.  Patient says that communications with her daughter has improved a little bit after acting on what we talked about last session.    Objective/Observations:   Participation: Active verbal participation, Attentive and Engaged   Grooming: Casual   Cognition: Alert and Fully Oriented   Eye contact: Limited   Mood: Depressed and Anxious   Affect: Anxious   Thought process: Logical and Goal-directed   Speech: Rate within normal limits   Other:     Diagnoses:   1. Schizoaffective disorder, depressive type (HCC)    2. PTSD (post-traumatic stress disorder)    3. Methamphetamine use disorder, severe, in early remission (HCC)         Current risk:   SUICIDE: Low   Homicide: Not applicable   Self-harm: Not applicable   Relapse: Low   Other:    Safety Plan reviewed? Not Indicated   If evidence of imminent risk is present, intervention/plan:     Therapeutic Intervention(s): Clarify:  Clarify feelings, Clarify thoughts and Clarify  values, Parenting/familial roles addressed, Self-care skills, Stressors assessed and Supportive psychotherapy    Treatment Goal(s)/Objective(s) addressed: Tx Goals:  - Utilize learned skills to manage mood and emotional suffering more effectively  - Utilize learned assertiveness skills to set boundaries & get needs met  - Learn to successfully challenge & change distortions in thinking  - Learn to be more emotionally flexible/resilient in times of stress/challenges  - Improve communication & social/ interpersonal skills  - Work an identified program of recovery & relapse prevention  - Successfully learn to eliminate or reduce intensity of dysregulated affect  - Learn to ameliorate effects of anxiety on life and functioning  - Improve quality of communication/interaction in parent/child relationship  - Increase behaviors of self-compassion and self-care  - The patient was educated to call 911, call the suicide hotline, or go to local ER if having thoughts of suicide or homicide; verbalized understanding.       Progress toward Treatment Goals: Moderate improvement    Plan:  - Continue Individual therapy and Medication management    Leanna Regalado, Ph.D.  This dictation has been created using voice recognition software and/or scribes. The accuracy of the dictation is limited by the abilities of the software and the expertise of the scribes. I expect there may be some errors of grammar and possibly content. I made every attempt to manually correct the errors within my dictation. However, errors related to voice recognition software and/or scribes may still exist and should be interpreted within the appropriate context.

## 2021-03-02 ENCOUNTER — HOSPITAL ENCOUNTER (EMERGENCY)
Facility: MEDICAL CENTER | Age: 66
End: 2021-03-02
Attending: EMERGENCY MEDICINE
Payer: MEDICARE

## 2021-03-02 VITALS
SYSTOLIC BLOOD PRESSURE: 128 MMHG | HEIGHT: 67 IN | TEMPERATURE: 97 F | RESPIRATION RATE: 18 BRPM | DIASTOLIC BLOOD PRESSURE: 78 MMHG | BODY MASS INDEX: 31.39 KG/M2 | OXYGEN SATURATION: 98 % | WEIGHT: 200 LBS | HEART RATE: 88 BPM

## 2021-03-02 DIAGNOSIS — F29 PSYCHOSIS, UNSPECIFIED PSYCHOSIS TYPE (HCC): ICD-10-CM

## 2021-03-02 DIAGNOSIS — R45.851 SUICIDAL IDEATION: ICD-10-CM

## 2021-03-02 LAB
AMPHET UR QL SCN: POSITIVE
BARBITURATES UR QL SCN: NEGATIVE
BENZODIAZ UR QL SCN: NEGATIVE
BZE UR QL SCN: NEGATIVE
CANNABINOIDS UR QL SCN: NEGATIVE
METHADONE UR QL SCN: NEGATIVE
OPIATES UR QL SCN: NEGATIVE
OXYCODONE UR QL SCN: NEGATIVE
PCP UR QL SCN: NEGATIVE
POC BREATHALIZER: 0 PERCENT (ref 0–0.01)
PROPOXYPH UR QL SCN: NEGATIVE

## 2021-03-02 PROCEDURE — 90791 PSYCH DIAGNOSTIC EVALUATION: CPT

## 2021-03-02 PROCEDURE — 99285 EMERGENCY DEPT VISIT HI MDM: CPT

## 2021-03-02 PROCEDURE — 302970 POC BREATHALIZER: Performed by: EMERGENCY MEDICINE

## 2021-03-02 PROCEDURE — 302970 POC BREATHALIZER

## 2021-03-02 PROCEDURE — 80307 DRUG TEST PRSMV CHEM ANLYZR: CPT

## 2021-03-02 RX ORDER — BENZTROPINE MESYLATE 0.5 MG/1
0.5 TABLET ORAL
COMMUNITY
Start: 2020-12-24 | End: 2021-04-29 | Stop reason: SDUPTHER

## 2021-03-02 NOTE — ED NOTES
All patient's belonging placed in one bag, provided pt with gown, underwear and blanket.   Sitter at bedside due to high risk of elopement and pt's safety due to hallucination.  Removed all unnecessary equipment out of the room
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
## 2021-03-02 NOTE — ED NOTES
Med rec updated and complete. Allergies reviewed. Pt is unable to participate in an interview at this time. Placed call to listed home pharmacy . Pt last filled and picked up prescriptions 12/20.      Medications remain on med rec.      Listed home pharmacy [facility]

## 2021-03-02 NOTE — ED NOTES
[facility] here to take pt to Behavioral Health. Report given, vss upon d/c  Packet given to Laura personal , all belongings taken out of locker 13

## 2021-03-02 NOTE — DISCHARGE PLANNING
Medical Social Work    Referral: Legal Hold    Intervention: Legal Hold Paperwork given to SW by [employer] RN Kalpana Ewing    Legal Hold Initiated: Date: 03- Time: 1219    Patient’s Insurance Listed on Face Sheet: Medicare    Referrals sent to: [facility], [facility], [facility], [facility]    This referral contains the following information:  1) Face sheet __x__  2) Page 1 and Page 2 of Legal Hold __x__  3) Alert Team Assessment/Psych Assessment __x__  4) Head to toe physical exam _x___  5) Urine Drug Screen _Has not been obtained  6) Blood Alcohol _x___  7) Vital signs __x__  8) Pregnancy test when applicable _NA__  9) Medications list __x__    Plan: Patient will transfer to mental health facility once acceptance is obtained

## 2021-03-02 NOTE — ED PROVIDER NOTES
"ED Provider Note    CHIEF COMPLAINT  Chief Complaint   Patient presents with   • Psych Eval     Auditory hallucinations       HPI  Angelica Dumnot is a 65 y.o. female who presents with hallucinations.  Patient has a history of substance abuse.  Reports that she fell off the wagon for about 5 days or so and had been snorting some sort of drugs she does not know what it was.  She is having hallucinations.  She is hearing voices.  She cannot tell me what they have been telling her but she told the nurse that the voices are trying to hurt her.  She has had suicidal thoughts.  She denies medical illness including fevers, chills, cough, congestion, runny nose, sore throat, abdominal pain, nausea vomiting diarrhea.    REVIEW OF SYSTEMS  As per HPI, otherwise a 10 point review of systems is negative    PAST MEDICAL HISTORY  Past Medical History:   Diagnosis Date   • Anxiety    • Depression    • Schizophrenia (HCC)    • Substance abuse (HCC)     meth use       SOCIAL HISTORY  Social History     Tobacco Use   • Smoking status: Current Every Day Smoker     Packs/day: 0.50   • Smokeless tobacco: Never Used   Substance Use Topics   • Alcohol use: Not Currently   • Drug use: Yes     Frequency: 3.0 times per week     Types: Methamphetamines, Inhaled       SURGICAL HISTORY  Past Surgical History:   Procedure Laterality Date   • APPENDECTOMY     • OPEN REDUCTION         CURRENT MEDICATIONS  Home Medications     Reviewed by Lachelle Pinto R.N. (Registered Nurse) on 03/02/21 at 0914  Med List Status: Partial   Medication Last Dose Status   aripiprazole (ABILIFY) 30 MG tablet  Active   PARoxetine (PAXIL) 30 MG Tab  Active                ALLERGIES  Allergies   Allergen Reactions   • Benadryl Allergy      \"I puff up\"   • Pcn [Penicillins]        PHYSICAL EXAM  VITAL SIGNS: /61   Pulse 79   Temp 36.6 °C (97.9 °F) (Temporal)   Resp 16   Ht 1.702 m (5' 7\")   Wt 90.7 kg (200 lb)   SpO2 93%   BMI 31.32 kg/m²  "   Constitutional: Awake and alert  HENT: Normal inspection  Eyes: Normal inspection  Neck: Grossly normal range of motion.  Cardiovascular: Normal heart rate, Normal rhythm.  Symmetric peripheral pulses.   Thorax & Lungs: No respiratory distress, No wheezing, No rales, No rhonchi, No chest tenderness.   Abdomen: Bowel sounds normal, soft, non-distended, nontender, no mass  Skin: No obvious rash.  Back: No tenderness, No CVA tenderness.   Extremities: No clubbing, cyanosis, edema, no Homans or cords.  Neurologic: Grossly normal   Psychiatric: Normal for situation        Labs:  Results for orders placed or performed during the hospital encounter of 03/02/21   POC BREATHALIZER   Result Value Ref Range    POC Breathalizer 0.00 0.00 - 0.01 Percent       COURSE & MEDICAL DECISION MAKING  Patient presents with suicidal thoughts and hallucinations.  She has no medical complaints or findings on exam.  Consult behavioral health.    Please see behavioral health evaluation.  Patient endorses suicidality.  She will be placed on a legal hold.  Medically stable for psychiatric referral      FINAL IMPRESSION  1.  Suicidal ideation  2.  Psychosis      This dictation was created using voice recognition software. The accuracy of the dictation is limited to the abilities of the software.  The nursing notes were reviewed and certain aspects of this information were incorporated into this note.      Electronically signed by: Cameron Castro M.D., 3/2/2021 10:28 AM

## 2021-03-02 NOTE — ED TRIAGE NOTES
"Chief Complaint   Patient presents with   • Psych Eval     Auditory hallucinations     Pt bib EMS for auditory hallucinations x 2 weeks. Pt states the voices are trying to hurt her. Pt tells this RN \"Why don't you just shoot me already?\". Pt is a poor historian with a flat affect. Pt denies drug use but tells EMS she did \"two large white lines yesterday but I don't know what they were\". Pt in gown. Chart up for ERP.  "

## 2021-03-02 NOTE — DISCHARGE PLANNING
Medical Social Work    LSW received call from ROBERT Campa w/[facility] ([facility]) stating the pt has been accepted by [facility].     PCS form and Facesheet faxed to [facility]. Transportation arranged w/ Mark @ [facility] for 1500.     LSW completed transfer packet and placed original LH in packet and placed on pt's chart.     Bedside RN and RB notified of 1500 transport time.

## 2021-03-03 DIAGNOSIS — Z23 NEED FOR VACCINATION: ICD-10-CM

## 2021-03-12 ENCOUNTER — HOSPITAL ENCOUNTER (OUTPATIENT)
Dept: BEHAVIORAL HEALTH | Facility: MEDICAL CENTER | Age: 66
End: 2021-03-12
Attending: PSYCHIATRY & NEUROLOGY
Payer: MEDICARE

## 2021-03-12 DIAGNOSIS — F25.1 SCHIZOAFFECTIVE DISORDER, DEPRESSIVE TYPE (HCC): ICD-10-CM

## 2021-03-12 PROCEDURE — 90791 PSYCH DIAGNOSTIC EVALUATION: CPT

## 2021-03-12 RX ORDER — DIVALPROEX SODIUM 500 MG/1
1000 TABLET, DELAYED RELEASE ORAL DAILY
COMMUNITY
Start: 2021-03-10 | End: 2021-12-02

## 2021-03-12 RX ORDER — OLANZAPINE 10 MG/1
10 TABLET ORAL DAILY
COMMUNITY
Start: 2021-03-10 | End: 2021-04-29 | Stop reason: SDUPTHER
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
## 2021-03-17 NOTE — PROGRESS NOTES
I have reviewed the note by Fay Dias RN and agree with the assessment and treatment plan.    1. Admit to Intensive Outpatient Program on 3/12/2021   - Group therapy per schedule,    - Psychoeducational groups per schedule,   - Individual/family counseling sessions with  per treatment plan.  2. Symptoms necessitating Intensive Outpatient Treatment: Schizoaffective Disorder  3. Medical screening/Physical exam per primary care provider or referring facility.    Vipul Aceves MD

## 2021-03-22 ENCOUNTER — TELEPHONE (OUTPATIENT)
Dept: BEHAVIORAL HEALTH | Facility: MEDICAL CENTER | Age: 66
End: 2021-03-22

## 2021-03-22 DIAGNOSIS — G62.9 NEUROPATHY: ICD-10-CM

## 2021-03-22 DIAGNOSIS — Z12.11 COLON CANCER SCREENING: ICD-10-CM

## 2021-03-22 DIAGNOSIS — Z12.31 ENCOUNTER FOR SCREENING MAMMOGRAM FOR MALIGNANT NEOPLASM OF BREAST: ICD-10-CM

## 2021-03-29 ENCOUNTER — HOSPITAL ENCOUNTER (OUTPATIENT)
Dept: BEHAVIORAL HEALTH | Facility: MEDICAL CENTER | Age: 66
End: 2021-03-29
Attending: PSYCHIATRY & NEUROLOGY
Payer: MEDICARE

## 2021-03-29 DIAGNOSIS — F15.99 OTHER STIMULANT USE, UNSPECIFIED WITH UNSPECIFIED STIMULANT-INDUCED DISORDER (HCC): ICD-10-CM

## 2021-03-29 DIAGNOSIS — F25.1 SCHIZOAFFECTIVE DISORDER, DEPRESSIVE TYPE (HCC): ICD-10-CM

## 2021-03-29 PROCEDURE — 90853 GROUP PSYCHOTHERAPY: CPT | Performed by: MARRIAGE & FAMILY THERAPIST

## 2021-03-29 NOTE — BH THERAPY
Group Therapy Checklist  Attendance: Attended  Attendance Duration (min): (60)  Number of Participants: 5  Program/Group: Intensive Outpatient Program  Topics Covered: Relapse Prevention  Participation: Active verbal participation  Affect/Mood Range: Flexible  Affect/Mood Display: Congruent w/content  Cognition: Oriented, Alert  Evidence of Imminent Suicide Risk: No  Evidence of imminent homicide risk: No  Therapeutic Interventions: Psychoeducation re: (Comment), Cognitive clarification  Progress Toward Treatment Goal: Moderate improvement

## 2021-03-29 NOTE — BH THERAPY
Group Therapy Checklist  Attendance: Attended  Attendance Duration (min): (90 minutes)  Number of Participants: 5  Program/Group: Intensive Outpatient Program  Topics Covered: (group therapy)  Participation: Active verbal participation, Attentive  Affect/Mood Range: Normal range, Flexible  Affect/Mood Display: Congruent w/content  Cognition: Alert, Oriented  Evidence of Imminent Suicide Risk: No  Evidence of imminent homicide risk: No  Therapeutic Interventions: Relapse prevention, Values clarification, Emotion clarification  Progress Toward Treatment Goal: No change.  She started IOP today because she wants to stop using meth and be able to be honest with her 2 daughters about being clean from drugs because being dishonest is against her values.  She also wants to be able to stop procrastinating and be able to move into a better housing situation.

## 2021-04-01 ENCOUNTER — HOSPITAL ENCOUNTER (OUTPATIENT)
Dept: BEHAVIORAL HEALTH | Facility: MEDICAL CENTER | Age: 66
End: 2021-04-01
Attending: PSYCHIATRY & NEUROLOGY
Payer: MEDICARE

## 2021-04-01 ENCOUNTER — TELEPHONE (OUTPATIENT)
Dept: BEHAVIORAL HEALTH | Facility: MEDICAL CENTER | Age: 66
End: 2021-04-01

## 2021-04-05 ENCOUNTER — APPOINTMENT (OUTPATIENT)
Dept: BEHAVIORAL HEALTH | Facility: MEDICAL CENTER | Age: 66
End: 2021-04-05
Attending: PSYCHIATRY & NEUROLOGY
Payer: MEDICARE

## 2021-04-06 ENCOUNTER — TELEPHONE (OUTPATIENT)
Dept: BEHAVIORAL HEALTH | Facility: MEDICAL CENTER | Age: 66
End: 2021-04-06

## 2021-04-06 NOTE — TELEPHONE ENCOUNTER
[facility]  TRANSFER/DISCHARGE SUMMARY FORM    HHPI / SCP: no Other Ins.: Medicare     Patient Name: Angelica Dumont  Admission Date: 3/12/2021  Level of Care Attended:  Intens.OP : 1955  Transfer/Discharge Date: MRN: [id number]  2021       SIGNIFICANT FINDINGS/CLINICAL IMPRESSION:   DSM Codes:   IOP    ICD10 Codes:   F25.1, F43.12, and F15.21    Additional problems identified via assessment: transportation in poor weather - she walks    Treatment Components in Which Patient Participated (check all that apply):  Education and group therapy x one day    Summary of Course of Treatment: attended one day only, unable to attend three days a week; unable to leave messages due to full mailbox     Condition at Time of Transfer/Discharge: Case administratively closed due to lack of pt contact for extended period of time.     [] Medications Reviewed with Copy to Patient    Referred to: Refer to [facility]: Outpatient Medication Management     Patient is in agreement with discharge plan: n/a, unable to communicate with patient    Fay Dias R.N.

## 2021-04-08 ENCOUNTER — APPOINTMENT (OUTPATIENT)
Dept: BEHAVIORAL HEALTH | Facility: MEDICAL CENTER | Age: 66
End: 2021-04-08
Attending: PSYCHIATRY & NEUROLOGY
Payer: MEDICARE

## 2021-04-15 ENCOUNTER — DOCUMENTATION (OUTPATIENT)
Dept: BEHAVIORAL HEALTH | Facility: CLINIC | Age: 66
End: 2021-04-15

## 2021-04-22 ENCOUNTER — APPOINTMENT (OUTPATIENT)
Dept: BEHAVIORAL HEALTH | Facility: MEDICAL CENTER | Age: 66
End: 2021-04-22
Attending: FAMILY MEDICINE
Payer: MEDICARE

## 2021-04-29 ENCOUNTER — HOSPITAL ENCOUNTER (OUTPATIENT)
Dept: BEHAVIORAL HEALTH | Facility: MEDICAL CENTER | Age: 66
End: 2021-04-29
Attending: PSYCHIATRY & NEUROLOGY
Payer: MEDICARE

## 2021-04-29 VITALS
HEIGHT: 67 IN | WEIGHT: 200 LBS | RESPIRATION RATE: 16 BRPM | BODY MASS INDEX: 31.39 KG/M2 | HEART RATE: 80 BPM | DIASTOLIC BLOOD PRESSURE: 69 MMHG | SYSTOLIC BLOOD PRESSURE: 122 MMHG

## 2021-04-29 DIAGNOSIS — F43.10 POSTTRAUMATIC STRESS DISORDER: ICD-10-CM

## 2021-04-29 DIAGNOSIS — F15.10 METHAMPHETAMINE ABUSE (HCC): ICD-10-CM

## 2021-04-29 DIAGNOSIS — F25.1 SCHIZOAFFECTIVE DISORDER, DEPRESSIVE TYPE (HCC): ICD-10-CM

## 2021-04-29 PROCEDURE — 99214 OFFICE O/P EST MOD 30 MIN: CPT | Performed by: FAMILY MEDICINE

## 2021-04-29 RX ORDER — BENZTROPINE MESYLATE 0.5 MG/1
0.5 TABLET ORAL
Qty: 30 TABLET | Refills: 7 | Status: SHIPPED
Start: 2021-04-29 | End: 2021-12-02

## 2021-04-29 RX ORDER — OLANZAPINE 10 MG/1
10 TABLET ORAL DAILY
Qty: 30 TABLET | Refills: 7 | Status: SHIPPED
Start: 2021-04-29 | End: 2021-12-02

## 2021-04-29 ASSESSMENT — ENCOUNTER SYMPTOMS
TINGLING: 0
RESPIRATORY NEGATIVE: 1
NAUSEA: 0
HALLUCINATIONS: 1
NEUROLOGICAL NEGATIVE: 1
HEMOPTYSIS: 0
HEADACHES: 0
EYES NEGATIVE: 1
MUSCULOSKELETAL NEGATIVE: 1
FEVER: 0
CONSTITUTIONAL NEGATIVE: 1
MYALGIAS: 0
GASTROINTESTINAL NEGATIVE: 1
CARDIOVASCULAR NEGATIVE: 1
BRUISES/BLEEDS EASILY: 0
HEARTBURN: 0
DOUBLE VISION: 0
DEPRESSION: 0
CHILLS: 0
INSOMNIA: 1
BLURRED VISION: 0
DIZZINESS: 0
COUGH: 0
PALPITATIONS: 0

## 2021-04-29 ASSESSMENT — LIFESTYLE VARIABLES: SUBSTANCE_ABUSE: 1

## 2021-04-29 NOTE — PROGRESS NOTES
Subjective:      Angelica Dumont is a 66 y.o. female who presents with Medication Management              Past Medical History:  No date: Anxiety  No date: Depression  No date: Schizophrenia (HCC)  No date: Substance abuse (HCC)      Comment:  meth use  Past Surgical History:  No date: APPENDECTOMY  No date: OPEN REDUCTION  Social History    Tobacco Use      Smoking status: Current Every Day Smoker        Packs/day: 0.50      Smokeless tobacco: Never Used    Alcohol use: Not Currently    Drug use: Yes      Frequency: 3.0 times per week      Types: Methamphetamines, Inhaled    Review of patient's family history indicates:  Problem: Cancer      Relation: Mother          Age of Onset: (Not Specified)  Problem: Alcohol abuse      Relation: Mother          Age of Onset: (Not Specified)  Problem: Alcohol abuse      Relation: Brother          Age of Onset: (Not Specified)  Problem: Drug abuse      Relation: Brother          Age of Onset: (Not Specified)  Problem: Schizophrenia      Relation: Brother          Age of Onset: (Not Specified)  Problem: Depression      Relation: Maternal Grandmother          Age of Onset: (Not Specified)  Problem: Alcohol abuse      Relation: Step-Father          Age of Onset: (Not Specified)      Current Outpatient Medications: •  divalproex (DEPAKOTE) 500 MG Tablet Delayed Response, Take 1,000 mg by mouth every day at 6 PM., Disp: , Rfl: •  olanzapine (ZYPREXA) 10 MG tablet, Take 10 mg by mouth every day at 6 PM., Disp: , Rfl: •  benztropine (COGENTIN) 0.5 MG Tab, Take 0.5 mg by mouth every bedtime., Disp: , Rfl:     Patient was instructed on the use of medications, either prescriptions or OTC and informed on when the appropriate follow up time period should be. In addition, patient was also instructed that should any acute worsening occur that they should notify this clinic asap or call 911.          Review of Systems   Constitutional: Negative.  Negative for chills and fever.   HENT:  "Negative.  Negative for hearing loss.    Eyes: Negative.  Negative for blurred vision and double vision.   Respiratory: Negative.  Negative for cough and hemoptysis.    Cardiovascular: Negative.  Negative for chest pain and palpitations.   Gastrointestinal: Negative.  Negative for heartburn and nausea.   Genitourinary: Negative.  Negative for dysuria.   Musculoskeletal: Negative.  Negative for myalgias.   Skin: Negative.  Negative for rash.   Neurological: Negative.  Negative for dizziness, tingling and headaches.   Endo/Heme/Allergies: Negative.  Does not bruise/bleed easily.   Psychiatric/Behavioral: Positive for hallucinations and substance abuse. Negative for depression and suicidal ideas. The patient has insomnia.    All other systems reviewed and are negative.         Objective:     /69   Pulse 80   Resp 16   Ht 1.702 m (5' 7\")   Wt 90.7 kg (200 lb)   BMI 31.32 kg/m²      Physical Exam  Vitals and nursing note reviewed.   Constitutional:       General: She is not in acute distress.     Appearance: She is well-developed. She is not diaphoretic.   HENT:      Head: Normocephalic and atraumatic.      Mouth/Throat:      Pharynx: No oropharyngeal exudate.   Eyes:      Pupils: Pupils are equal, round, and reactive to light.   Cardiovascular:      Rate and Rhythm: Normal rate and regular rhythm.      Heart sounds: Normal heart sounds. No murmur. No friction rub. No gallop.    Pulmonary:      Effort: Pulmonary effort is normal. No respiratory distress.      Breath sounds: Normal breath sounds. No wheezing or rales.   Chest:      Chest wall: No tenderness.   Neurological:      Mental Status: She is alert and oriented to person, place, and time.   Psychiatric:         Mood and Affect: Mood is not anxious or depressed.         Behavior: Behavior normal.         Thought Content: Thought content normal.         Judgment: Judgment normal.                 Assessment/Plan:     1. Methamphetamine abuse (HCC)  Had an " episode of use last month with hallucinations and did 8 day inpatient stay. Changed meds to zyprexa and depakote and reports feeling better and no use of meth for 3 weeks    2. Schizoaffective disorder, depressive type (HCC)  stable    3. PTSD (post-traumatic stress disorder)  Stable  Trying to get out of her apartment complex due to continued drug use there

## 2021-04-29 NOTE — ADDENDUM NOTE
Encounter addended by: Maurice De La Vega M.D. on: 4/29/2021 3:12 PM   Actions taken: Order list changed, Diagnosis association updated

## 2021-06-10 ENCOUNTER — HOSPITAL ENCOUNTER (OUTPATIENT)
Dept: BEHAVIORAL HEALTH | Facility: MEDICAL CENTER | Age: 66
End: 2021-06-10
Attending: FAMILY MEDICINE
Payer: MEDICARE

## 2021-06-10 VITALS
OXYGEN SATURATION: 95 % | DIASTOLIC BLOOD PRESSURE: 70 MMHG | BODY MASS INDEX: 29.7 KG/M2 | HEIGHT: 68 IN | WEIGHT: 196 LBS | HEART RATE: 86 BPM | SYSTOLIC BLOOD PRESSURE: 132 MMHG

## 2021-06-10 DIAGNOSIS — F15.21 METHAMPHETAMINE USE DISORDER, SEVERE, IN EARLY REMISSION (HCC): ICD-10-CM

## 2021-06-10 DIAGNOSIS — F25.1 SCHIZOAFFECTIVE DISORDER, DEPRESSIVE TYPE (HCC): ICD-10-CM

## 2021-06-10 DIAGNOSIS — H60.312 ACUTE DIFFUSE OTITIS EXTERNA OF LEFT EAR: ICD-10-CM

## 2021-06-10 PROCEDURE — 99214 OFFICE O/P EST MOD 30 MIN: CPT | Performed by: FAMILY MEDICINE

## 2021-06-10 RX ORDER — NEOMYCIN SULFATE, POLYMYXIN B SULFATE AND HYDROCORTISONE 10; 3.5; 1 MG/ML; MG/ML; [USP'U]/ML
4 SUSPENSION/ DROPS AURICULAR (OTIC) 3 TIMES DAILY
Qty: 10 ML | Refills: 1 | Status: SHIPPED
Start: 2021-06-10 | End: 2021-10-06

## 2021-06-10 ASSESSMENT — ENCOUNTER SYMPTOMS
COUGH: 0
RESPIRATORY NEGATIVE: 1
BRUISES/BLEEDS EASILY: 0
BLURRED VISION: 0
DEPRESSION: 0
EYES NEGATIVE: 1
NAUSEA: 0
FEVER: 0
HEMOPTYSIS: 0
DOUBLE VISION: 0
TINGLING: 0
DIZZINESS: 0
CARDIOVASCULAR NEGATIVE: 1
MUSCULOSKELETAL NEGATIVE: 1
NEUROLOGICAL NEGATIVE: 1
CHILLS: 0
CONSTITUTIONAL NEGATIVE: 1
GASTROINTESTINAL NEGATIVE: 1
PALPITATIONS: 0
HEARTBURN: 0
HEADACHES: 0
MYALGIAS: 0

## 2021-06-10 ASSESSMENT — LIFESTYLE VARIABLES: SUBSTANCE_ABUSE: 1

## 2021-06-10 NOTE — PROGRESS NOTES
Psychiatric Progress Note               Author: Maurice De La Vega M.D. Date & Time created: 6/10/2021  1:10 PM     Interval History:  meds have been doing well at stabilizing mood  No use of meth for 10 days  Today the patient was counseled on avoidance of people, places and things that could be triggers for substance use  Some left ear d/c and pain  On exam with left otitis externa will treat    Review of Systems:  Review of Systems   Constitutional: Negative.  Negative for chills and fever.   HENT: Positive for ear discharge and ear pain. Negative for hearing loss.    Eyes: Negative.  Negative for blurred vision and double vision.   Respiratory: Negative.  Negative for cough and hemoptysis.    Cardiovascular: Negative.  Negative for chest pain and palpitations.   Gastrointestinal: Negative.  Negative for heartburn and nausea.   Genitourinary: Negative.  Negative for dysuria.   Musculoskeletal: Negative.  Negative for myalgias.   Skin: Negative.  Negative for rash.   Neurological: Negative.  Negative for dizziness, tingling and headaches.   Endo/Heme/Allergies: Negative.  Does not bruise/bleed easily.   Psychiatric/Behavioral: Positive for substance abuse. Negative for depression and suicidal ideas.   All other systems reviewed and are negative.      Physical Exam:  Physical Exam  Vitals and nursing note reviewed.   Constitutional:       General: She is not in acute distress.     Appearance: She is well-developed. She is not diaphoretic.   HENT:      Head: Normocephalic and atraumatic.      Left Ear: Drainage, swelling and tenderness present.      Mouth/Throat:      Pharynx: No oropharyngeal exudate.   Eyes:      Pupils: Pupils are equal, round, and reactive to light.   Cardiovascular:      Rate and Rhythm: Normal rate and regular rhythm.      Heart sounds: Normal heart sounds. No murmur heard.   No friction rub. No gallop.    Pulmonary:      Effort: Pulmonary effort is normal. No respiratory distress.      Breath  sounds: Normal breath sounds. No wheezing or rales.   Chest:      Chest wall: No tenderness.   Neurological:      Mental Status: She is alert and oriented to person, place, and time.   Psychiatric:         Behavior: Behavior normal.         Thought Content: Thought content normal.         Judgment: Judgment normal.         Labs:  No results found for this or any previous visit (from the past 24 hour(s)).    Hemodynamics:  No data recorded.     Pulse  Av  Min: 86  Max: 86  Blood Pressure : 132/70     Respiratory:    Pulse Oximetry: 95 %           Fluids:  No intake or output data in the 24 hours ending 06/10/21 1310  Weight: 88.9 kg (196 lb)  GI/Nutrition:  No orders of the defined types were placed in this encounter.    Medications:  Current Outpatient Medications   Medication   • neomycin-polymyxin-HC (PEDIOTIC HC) 3.5-87396-8 Suspension   • olanzapine (ZYPREXA) 10 MG tablet   • benztropine (COGENTIN) 0.5 MG Tab   • divalproex (DEPAKOTE) 500 MG Tablet Delayed Response     No current facility-administered medications for this encounter.     Medical Decision Making, by Problem:  There are no active hospital problems to display for this patient.    Plan:    1. Schizoaffective disorder, depressive type (HCC)     2. Methamphetamine use disorder, severe, in early remission ([facility])     3. Acute diffuse otitis externa of left ear  neomycin-polymyxin-HC (PEDIOTIC HC) 3.5-72338-9 Suspension

## 2021-08-06 ENCOUNTER — TELEPHONE (OUTPATIENT)
Dept: MEDICAL GROUP | Facility: PHYSICIAN GROUP | Age: 66
End: 2021-08-06

## 2021-08-06 NOTE — TELEPHONE ENCOUNTER
Pt called stating that she needs a referral to have someone check her neuropathy. Pt states that she has had bilateral feet numbness for quite some time and she would like someone to help her with this. She also is needing an order for a mammogram and a fit kit since her previous orders . She would like all of this information mailed to her when it's completed.

## 2021-08-10 NOTE — TELEPHONE ENCOUNTER
Called pt and advised her of the new orders. Will send out in today's mail, pt is aware and knows to expect them

## 2021-08-18 ENCOUNTER — HOSPITAL ENCOUNTER (OUTPATIENT)
Dept: RADIOLOGY | Facility: MEDICAL CENTER | Age: 66
End: 2021-08-18
Attending: FAMILY MEDICINE
Payer: MEDICARE

## 2021-08-18 DIAGNOSIS — Z12.31 ENCOUNTER FOR SCREENING MAMMOGRAM FOR MALIGNANT NEOPLASM OF BREAST: ICD-10-CM

## 2021-08-18 PROCEDURE — 77063 BREAST TOMOSYNTHESIS BI: CPT

## 2021-10-06 ENCOUNTER — HOSPITAL ENCOUNTER (EMERGENCY)
Facility: MEDICAL CENTER | Age: 66
End: 2021-10-06
Payer: MEDICARE

## 2021-10-06 ENCOUNTER — OFFICE VISIT (OUTPATIENT)
Dept: URGENT CARE | Facility: CLINIC | Age: 66
End: 2021-10-06
Payer: MEDICARE

## 2021-10-06 VITALS
TEMPERATURE: 98.3 F | DIASTOLIC BLOOD PRESSURE: 80 MMHG | OXYGEN SATURATION: 94 % | RESPIRATION RATE: 32 BRPM | WEIGHT: 195.4 LBS | HEIGHT: 68 IN | BODY MASS INDEX: 29.61 KG/M2 | SYSTOLIC BLOOD PRESSURE: 124 MMHG | HEART RATE: 81 BPM

## 2021-10-06 VITALS
RESPIRATION RATE: 18 BRPM | OXYGEN SATURATION: 99 % | DIASTOLIC BLOOD PRESSURE: 91 MMHG | SYSTOLIC BLOOD PRESSURE: 153 MMHG | WEIGHT: 196.65 LBS | BODY MASS INDEX: 30.87 KG/M2 | HEIGHT: 67 IN | HEART RATE: 89 BPM | TEMPERATURE: 97.5 F

## 2021-10-06 DIAGNOSIS — F15.10 METHAMPHETAMINE ABUSE (HCC): ICD-10-CM

## 2021-10-06 DIAGNOSIS — R47.81 SLURRED SPEECH: ICD-10-CM

## 2021-10-06 PROCEDURE — 99204 OFFICE O/P NEW MOD 45 MIN: CPT | Performed by: PHYSICIAN ASSISTANT

## 2021-10-06 PROCEDURE — 302449 STATCHG TRIAGE ONLY (STATISTIC)

## 2021-10-06 ASSESSMENT — ENCOUNTER SYMPTOMS
VERTIGO: 0
FATIGUE: 1
ALTERED MENTAL STATUS: 0
LIGHT-HEADEDNESS: 0
VISUAL CHANGE: 0
WEAKNESS: 1
DIAPHORESIS: 0
DIZZINESS: 0
VOMITING: 0
SLURRED SPEECH: 1
BOWEL INCONTINENCE: 0
FOCAL WEAKNESS: 0
PALPITATIONS: 0

## 2021-10-06 NOTE — PROGRESS NOTES
Subjective     Angelica Dumont is a 66 y.o. female who presents with Extremity Weakness ([employer]ported [employer].  The patient thinks she had a stroke two weeks ago.  Speech is slurred, facial expressions are different and going upstair is almost impossible. She also stated that she was having a hard time eating/swallowing.  She stopped taking her medications (Depakote) and also was taking meth.  S)            Patient dropped off by [facility]    Patient reports approximately 2 weeks ago she started having slurred speech, difficulty with facial expressions, weakness and fatigue.  Her daughters and neighbor are concerned that she had an acute CVA.  Patient states she has had ongoing speech changes foggy memory.  Patient does admit to smoking methamphetamines every 2 to 3 days.  She has also been off her 3 psych meds for paranoid schizophrenia for 2 months.    Extremity Weakness  The patient's primary symptoms include slurred speech and weakness. The patient's pertinent negatives include no altered mental status, focal weakness or visual change. This is a new problem. The current episode started 1 to 4 weeks ago (2 weeks). The neurological problem developed suddenly. The problem is unchanged. There was right-sided and upper extremity focality noted. Associated symptoms include fatigue. Pertinent negatives include no auditory change, bladder incontinence, bowel incontinence, chest pain, diaphoresis, dizziness, light-headedness, palpitations, vertigo or vomiting. Past treatments include nothing. The treatment provided no relief.       PMH:  has a past medical history of Anxiety, Depression, Schizophrenia (HCC), and Substance abuse (HCC).  MEDS:   Current Outpatient Medications:   •  benztropine (COGENTIN) 0.5 MG Tab, Take 1 tablet by mouth at bedtime., Disp: 30 tablet, Rfl: 7  •  olanzapine (ZYPREXA) 10 MG tablet, Take 1 tablet by mouth every day at 6 PM. (Patient not taking: Reported on 10/6/2021), Disp: 30 tablet, Rfl:  "7  •  divalproex (DEPAKOTE) 500 MG Tablet Delayed Response, Take 1,000 mg by mouth every day at 6 PM. (Patient not taking: Reported on 10/6/2021), Disp: , Rfl:   ALLERGIES:   Allergies   Allergen Reactions   • Benadryl Allergy      \"I puff up\"   • Pcn [Penicillins]      SURGHX:   Past Surgical History:   Procedure Laterality Date   • APPENDECTOMY     • OPEN REDUCTION       SOCHX:  reports that she has been smoking. She has been smoking about 0.50 packs per day. She has never used smokeless tobacco. She reports previous alcohol use. She reports current drug use. Frequency: 3.00 times per week. Drugs: Methamphetamines and Inhaled.  FH: family history includes Alcohol abuse in her brother, mother, and step-father; Cancer in her mother; Depression in her maternal grandmother; Drug abuse in her brother; Schizophrenia in her brother.    Review of Systems   Constitutional: Positive for fatigue. Negative for diaphoresis.   Cardiovascular: Negative for chest pain and palpitations.   Gastrointestinal: Negative for bowel incontinence and vomiting.   Genitourinary: Negative for bladder incontinence.   Musculoskeletal: Positive for extremity weakness.   Neurological: Positive for weakness. Negative for dizziness, vertigo, focal weakness and light-headedness.       Medications, Allergies, and current problem list reviewed today in Epic           Objective     /80 (BP Location: Left arm, Patient Position: Sitting, BP Cuff Size: Adult)   Pulse 81   Temp 36.8 °C (98.3 °F) (Temporal)   Resp (!) 32   Ht 1.715 m (5' 7.5\")   Wt 88.6 kg (195 lb 6.4 oz)   SpO2 94%   BMI 30.15 kg/m²      Physical Exam  Vitals and nursing note reviewed.   Constitutional:       General: She is not in acute distress.     Appearance: She is well-developed. She is not diaphoretic.   HENT:      Head: Normocephalic and atraumatic.   Eyes:      Conjunctiva/sclera: Conjunctivae normal.   Cardiovascular:      Rate and Rhythm: Normal rate and regular " rhythm.      Heart sounds: Normal heart sounds.   Pulmonary:      Effort: Pulmonary effort is normal. No respiratory distress.      Breath sounds: Normal breath sounds. No wheezing, rhonchi or rales.   Musculoskeletal:      Cervical back: Normal range of motion and neck supple.   Skin:     General: Skin is warm and dry.   Neurological:      General: No focal deficit present.      Mental Status: She is alert and oriented to person, place, and time.      Sensory: No sensory deficit.      Motor: Weakness present.      Gait: Gait abnormal.      Deep Tendon Reflexes: Reflexes normal.      Comments: Speech change noted   Psychiatric:         Mood and Affect: Mood normal.         Speech: Speech is slurred.         Behavior: Behavior normal.         Thought Content: Thought content normal.         Judgment: Judgment normal.                             Assessment & Plan         1. Slurred speech  CT-HEAD W/O   2. Methamphetamine abuse (HCC)       Possible CVA type symptoms for 2 weeks.  Slurred speech, foggy mentation, weakness and fatigue.  Patient reports difficulty with facial expressions.  Her entire family is worried about her and she is not acting her normal self.  She does admit to smoking methamphetamines every 2 to 3 days.  She has also been off her psych meds for paranoid schizophrenia for 3 months.  Patient was brought in by EMS and dropped off in the urgent care.  I did evaluate patient, vital signs are normal and neuro exam shows some right-sided weakness but no significant neurological deficit.  Her speech has changed.  I have no access to advanced imaging at this location.  Patient does not have a ride so I cannot send her to a different location.  Spoke with colleague physician who agrees that patient should be transferred to ER.  EMS was called and they will take her now    Please note that this dictation was created using voice recognition software. I have made every reasonable attempt to correct obvious  errors, but I expect that there are errors of grammar and possibly content that I did not discover before finalizing the note.

## 2021-10-06 NOTE — ED TRIAGE NOTES
"Chief Complaint   Patient presents with   • Possible Stroke     Pt states \"It's the general consensus of my family and my  that I had a stroke,\" pt reports it happened 2 weeks ago. Pt has a laundry list of symptoms- slurred speech, trouble walking up stairs, trouble swallowing- however these symptoms have all resolved per patient.    • Psych Eval     Pt reports she stopped taking her psych meds \"a couple months ago.\" Pt states she should probably go back on them. Pt reports she smoked meth about three days ago. Pt doesn't wish for treatment, \"I've been through the programs.\"     /78   Pulse 85   Temp 36.4 °C (97.5 °F) (Temporal)   Resp 16   Ht 1.702 m (5' 7\")   Wt 89.2 kg (196 lb 10.4 oz)   SpO2 96%   BMI 30.80 kg/m²     Pt is ambulatory in and out of triage with her personal cane. Appropriate PPE worn throughout entire encounter. Pt placed back in the lobby and educated about triage process.    "

## 2021-10-07 NOTE — ED NOTES
Patient vital signs rechecked and documented per [facility]. Patient denies any new needs at this time.  Patient updated on wait times, thanked for patience. Pt informed to alert triage tech or triage RN with any needs and/or changes in condition; patient verbalized understanding.

## 2021-10-07 NOTE — ED NOTES
Patient became very aggressive with staff. Patient stated she wanted to AMA. RN came out to get patient to bring back to her room. Patient began slamming her belongings on the the counter and throwing her things at staff. Security called to escort patient out of lobby. Patient refused to sign AMA. Patient will be dismissed out of the system.

## 2021-10-07 NOTE — ED NOTES
Triage rounding: Pt sitting up triage chair, nad. Updated with wait times, apologized for the wait. Vital rechecked ,stable

## 2021-12-02 ENCOUNTER — OFFICE VISIT (OUTPATIENT)
Dept: NEUROLOGY | Facility: MEDICAL CENTER | Age: 66
End: 2021-12-02
Attending: PSYCHIATRY & NEUROLOGY
Payer: MEDICARE

## 2021-12-02 VITALS
TEMPERATURE: 97.7 F | BODY MASS INDEX: 26.61 KG/M2 | WEIGHT: 169.53 LBS | DIASTOLIC BLOOD PRESSURE: 56 MMHG | OXYGEN SATURATION: 94 % | HEART RATE: 95 BPM | HEIGHT: 67 IN | SYSTOLIC BLOOD PRESSURE: 98 MMHG

## 2021-12-02 DIAGNOSIS — E07.89 OTHER SPECIFIED DISORDERS OF THYROID: ICD-10-CM

## 2021-12-02 DIAGNOSIS — E74.819 DISORDERS OF GLUCOSE TRANSPORT, UNSPECIFIED (HCC): ICD-10-CM

## 2021-12-02 DIAGNOSIS — F25.1 SCHIZOAFFECTIVE DISORDER, DEPRESSIVE TYPE (HCC): ICD-10-CM

## 2021-12-02 DIAGNOSIS — G62.9 PERIPHERAL POLYNEUROPATHY: ICD-10-CM

## 2021-12-02 PROCEDURE — 99211 OFF/OP EST MAY X REQ PHY/QHP: CPT | Performed by: PSYCHIATRY & NEUROLOGY

## 2021-12-02 PROCEDURE — 99204 OFFICE O/P NEW MOD 45 MIN: CPT | Performed by: PSYCHIATRY & NEUROLOGY

## 2021-12-02 ASSESSMENT — ANXIETY QUESTIONNAIRES
5. BEING SO RESTLESS THAT IT IS HARD TO SIT STILL: NOT AT ALL
3. WORRYING TOO MUCH ABOUT DIFFERENT THINGS: NOT AT ALL
1. FEELING NERVOUS, ANXIOUS, OR ON EDGE: SEVERAL DAYS
2. NOT BEING ABLE TO STOP OR CONTROL WORRYING: NOT AT ALL
GAD7 TOTAL SCORE: 2
7. FEELING AFRAID AS IF SOMETHING AWFUL MIGHT HAPPEN: NOT AT ALL
IF YOU CHECKED OFF ANY PROBLEMS ON THIS QUESTIONNAIRE, HOW DIFFICULT HAVE THESE PROBLEMS MADE IT FOR YOU TO DO YOUR WORK, TAKE CARE OF THINGS AT HOME, OR GET ALONG WITH OTHER PEOPLE: NOT DIFFICULT AT ALL
6. BECOMING EASILY ANNOYED OR IRRITABLE: NOT AT ALL
4. TROUBLE RELAXING: SEVERAL DAYS

## 2021-12-02 ASSESSMENT — PATIENT HEALTH QUESTIONNAIRE - PHQ9: CLINICAL INTERPRETATION OF PHQ2 SCORE: 0

## 2021-12-02 NOTE — Clinical Note
Follow up with Inna Lynn in 6 months.  40 minute visit please.  Reason:Peripheral Neuropathy.    Drew

## 2022-11-11 ENCOUNTER — PATIENT MESSAGE (OUTPATIENT)
Dept: HEALTH INFORMATION MANAGEMENT | Facility: OTHER | Age: 67
End: 2022-11-11

## 2023-07-15 ENCOUNTER — HOSPITAL ENCOUNTER (EMERGENCY)
Facility: MEDICAL CENTER | Age: 68
End: 2023-07-15
Payer: MEDICARE

## 2023-07-15 VITALS
WEIGHT: 172.62 LBS | SYSTOLIC BLOOD PRESSURE: 110 MMHG | DIASTOLIC BLOOD PRESSURE: 56 MMHG | BODY MASS INDEX: 27.04 KG/M2 | TEMPERATURE: 98.3 F | RESPIRATION RATE: 16 BRPM | HEART RATE: 79 BPM | OXYGEN SATURATION: 92 %

## 2023-07-15 PROCEDURE — 302449 STATCHG TRIAGE ONLY (STATISTIC)

## 2023-07-15 RX ORDER — ARIPIPRAZOLE 15 MG/1
15 TABLET ORAL DAILY
COMMUNITY

## 2023-07-15 NOTE — ED NOTES
Attempted to bring pt back to room, no response from lobby, restroom, or lab draw line. Will check again in 5-10 minutes

## 2023-07-15 NOTE — ED TRIAGE NOTES
Pt to triage .  Chief Complaint   Patient presents with    Leg Swelling     Pt c/o bilateral feet/leg swelling x 1 wk

## 2023-07-16 NOTE — ED NOTES
Second attempt to bring pt back to room, no response from lobby, restroom, or lab draw line. Will dismiss at this time
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
## 2025-07-24 ENCOUNTER — HOSPITAL ENCOUNTER (EMERGENCY)
Facility: MEDICAL CENTER | Age: 70
End: 2025-07-24
Payer: MEDICARE

## 2025-07-24 VITALS
RESPIRATION RATE: 14 BRPM | DIASTOLIC BLOOD PRESSURE: 57 MMHG | WEIGHT: 179 LBS | BODY MASS INDEX: 28.09 KG/M2 | HEART RATE: 74 BPM | HEIGHT: 67 IN | TEMPERATURE: 96.8 F | SYSTOLIC BLOOD PRESSURE: 131 MMHG | OXYGEN SATURATION: 94 %

## 2025-07-24 PROCEDURE — 302449 STATCHG TRIAGE ONLY (STATISTIC)

## 2025-07-24 ASSESSMENT — FIBROSIS 4 INDEX: FIB4 SCORE: 0.91

## 2025-07-25 NOTE — ED NOTES
Called pt from lobby , no answer  Checked restroom, senior lounge and family room unable to locate patient.

## 2025-07-25 NOTE — ED TRIAGE NOTES
"Chief Complaint   Patient presents with    Leg Swelling     Bilateral lower extremity swelling x4 wks      BIB EMS from EMS     Pt states today edema did not decrease with elevation.    Pt is alert/oriented and follows commands. Pt speaking in full sentences and responds appropriately to questions. No acute distress noted in triage and respirations are even and unlabored.      Pt placed in lobby and educated on triage process. Pt encouraged to alert staff for any changes in condition.  /57   Pulse 74   Temp 36 °C (96.8 °F) (Temporal)   Resp 14   Ht 1.702 m (5' 7\")   Wt 81.2 kg (179 lb)   SpO2 94%   BMI 28.04 kg/m²       Denies CP or SOB    "